# Patient Record
Sex: FEMALE | Race: WHITE | NOT HISPANIC OR LATINO | Employment: UNEMPLOYED | URBAN - METROPOLITAN AREA
[De-identification: names, ages, dates, MRNs, and addresses within clinical notes are randomized per-mention and may not be internally consistent; named-entity substitution may affect disease eponyms.]

---

## 2017-05-22 ENCOUNTER — AMBULATORY - HEALTHEAST (OUTPATIENT)
Dept: MULTI SPECIALTY CLINIC | Facility: CLINIC | Age: 37
End: 2017-05-22

## 2017-05-22 LAB
HPV_EXT - HISTORICAL: NORMAL
PAP SMEAR - HIM PATIENT REPORTED: NORMAL

## 2018-06-28 ENCOUNTER — OFFICE VISIT - HEALTHEAST (OUTPATIENT)
Dept: FAMILY MEDICINE | Facility: CLINIC | Age: 38
End: 2018-06-28

## 2018-06-28 ENCOUNTER — COMMUNICATION - HEALTHEAST (OUTPATIENT)
Dept: TELEHEALTH | Facility: CLINIC | Age: 38
End: 2018-06-28

## 2018-06-28 DIAGNOSIS — S52.501A: ICD-10-CM

## 2018-06-28 DIAGNOSIS — M25.531 RIGHT WRIST PAIN: ICD-10-CM

## 2018-06-28 DIAGNOSIS — S52.601A: ICD-10-CM

## 2018-06-28 ASSESSMENT — MIFFLIN-ST. JEOR: SCORE: 1284.44

## 2018-07-09 ENCOUNTER — SURGERY - HEALTHEAST (OUTPATIENT)
Dept: SURGERY | Facility: CLINIC | Age: 38
End: 2018-07-09

## 2018-07-09 ENCOUNTER — ANESTHESIA - HEALTHEAST (OUTPATIENT)
Dept: SURGERY | Facility: CLINIC | Age: 38
End: 2018-07-09

## 2018-07-09 ASSESSMENT — MIFFLIN-ST. JEOR: SCORE: 1296.06

## 2019-03-15 ENCOUNTER — OFFICE VISIT (OUTPATIENT)
Dept: FAMILY MEDICINE | Facility: CLINIC | Age: 39
End: 2019-03-15
Payer: MEDICAID

## 2019-03-15 VITALS
HEIGHT: 68 IN | DIASTOLIC BLOOD PRESSURE: 74 MMHG | WEIGHT: 136 LBS | RESPIRATION RATE: 16 BRPM | HEART RATE: 94 BPM | TEMPERATURE: 97.7 F | SYSTOLIC BLOOD PRESSURE: 114 MMHG | BODY MASS INDEX: 20.61 KG/M2 | OXYGEN SATURATION: 97 %

## 2019-03-15 DIAGNOSIS — Z23 NEED FOR PROPHYLACTIC VACCINATION WITH TETANUS-DIPHTHERIA (TD): ICD-10-CM

## 2019-03-15 DIAGNOSIS — F41.1 GAD (GENERALIZED ANXIETY DISORDER): Primary | ICD-10-CM

## 2019-03-15 DIAGNOSIS — Z23 NEED FOR PROPHYLACTIC VACCINATION AND INOCULATION AGAINST INFLUENZA: ICD-10-CM

## 2019-03-15 DIAGNOSIS — F41.0 PANIC ATTACK: ICD-10-CM

## 2019-03-15 PROCEDURE — 99203 OFFICE O/P NEW LOW 30 MIN: CPT | Performed by: NURSE PRACTITIONER

## 2019-03-15 RX ORDER — LORAZEPAM 0.5 MG/1
0.5 TABLET ORAL EVERY 6 HOURS PRN
Qty: 10 TABLET | Refills: 0 | Status: SHIPPED | OUTPATIENT
Start: 2019-03-15 | End: 2020-12-18

## 2019-03-15 ASSESSMENT — ENCOUNTER SYMPTOMS
DIARRHEA: 0
EYE PAIN: 0
CHILLS: 0
HEMATOCHEZIA: 0
COUGH: 0
NERVOUS/ANXIOUS: 1
FEVER: 0
ABDOMINAL PAIN: 0
CONSTIPATION: 0
DIZZINESS: 0
HEMATURIA: 0

## 2019-03-15 ASSESSMENT — PATIENT HEALTH QUESTIONNAIRE - PHQ9: SUM OF ALL RESPONSES TO PHQ QUESTIONS 1-9: 25

## 2019-03-15 ASSESSMENT — MIFFLIN-ST. JEOR: SCORE: 1347.45

## 2019-03-15 NOTE — PROGRESS NOTES
"   SUBJECTIVE:   CC: Tracy Davis is an 38 year old woman who presents for preventive health visit.     Physical   Annual:     Getting at least 3 servings of Calcium per day:  Yes    Bi-annual eye exam:  Yes    Dental care twice a year:  Yes    Sleep apnea or symptoms of sleep apnea:  None    Diet:  Regular (no restrictions)    Frequency of exercise:  2-3 days/week    Duration of exercise:  15-30 minutes    Taking medications regularly:  Yes    Additional concerns today:  No    PHQ-2 Total Score: 2    Establishing care.  She plans to go to OB/GYN for her physical/pap.    Anxiety:  Requesting anxiety medications today.  On and off anxiety meds for 8 years.  Struggling with panic attacks.  \"with Lorazepam was on a really low dosage and it was really helpful for me.\"  Previous meds used Lorazepam and lexapro.  She requests to only be on meds when she needs them.  Weekly psychotherapy.      Per boyfriend:  He has witnessed 2 of Tracy's panic attacks, September 2018 and 2 weeks ago.    Lexapro history :  \"it helps my general anxiety and worry.\"  Panic attacks       Today's PHQ-2 Score:   PHQ-2 ( 1999 Pfizer) 3/15/2019   Q1: Little interest or pleasure in doing things 1   Q2: Feeling down, depressed or hopeless 1   PHQ-2 Score 2   Q1: Little interest or pleasure in doing things Several days   Q2: Feeling down, depressed or hopeless Several days   PHQ-2 Score 2           Problem list and histories reviewed & adjusted, as indicated.  Additional history: as documented    Patient Active Problem List   Diagnosis     JUANPABLO (generalized anxiety disorder)     No past surgical history on file.    Social History     Tobacco Use     Smoking status: Not on file     Smokeless tobacco: Never Used   Substance Use Topics     Alcohol use: Yes     Family History   Problem Relation Age of Onset     Breast Cancer Mother      Hypertension Father          Current Outpatient Medications   Medication Sig Dispense Refill     LORazepam " "(ATIVAN) 0.5 MG tablet Take 1 tablet (0.5 mg) by mouth every 6 hours as needed for anxiety 10 tablet 0     sertraline (ZOLOFT) 50 MG tablet 1/2 tablet daily for 1 week and then 1 tablet daily. 30 tablet 1     No Known Allergies  No lab results found.   BP Readings from Last 3 Encounters:   03/15/19 114/74    Wt Readings from Last 3 Encounters:   03/15/19 61.7 kg (136 lb)                  Labs reviewed in EPIC    Reviewed and updated as needed this visit by clinical staff  Allergies  Meds  Problems       Reviewed and updated as needed this visit by Provider  Problems         ROS:  Constitutional, HEENT, cardiovascular, pulmonary, GI, , musculoskeletal, neuro, skin, endocrine and psych systems are negative, except as otherwise noted.    OBJECTIVE:     /74   Pulse 94   Temp 97.7  F (36.5  C) (Oral)   Resp 16   Ht 1.715 m (5' 7.5\")   Wt 61.7 kg (136 lb)   LMP 02/20/2019   SpO2 97%   BMI 20.99 kg/m    Body mass index is 20.99 kg/m .  GENERAL APPEARANCE: healthy, alert. She appears worried, nervous.  SKIN: warm and dry  PSYCH: mentation appears normal.  Good hygeine.  Good eye contact but tearful at times.  She offers information freely.   She is constantly picking at her fingers.    ASSESSMENT/PLAN:     (F41.1) JUANPABLO (generalized anxiety disorder)  (primary encounter diagnosis)  Comment: uncontrolled  Plan: sertraline (ZOLOFT) 50 MG tablet, MENTAL HEALTH        REFERRAL  - Adult; Psychiatry and Medication         Management; Psychiatry; [place]: [facility]        Psychiatry Service [phone].  Medication         management & future refills will be returned to        [place] PCP upon completion of evaluation; We         jose r..., OB/GYN REFERRAL        I had a lengthy discussion with the patient and her boyfriend today about her anxiety, panic attacks, previous Lexapro use, continuing psychotherapy, her up-and-down mood etc.  For today, I did tell her that I think Lexapro is an okay choice, but I " would prefer that she try Zoloft to start.  I like the wide dosing range, the low side effect profile, and the FDA approved uses for Zoloft including panic, anxiety, OCD etc.  She agrees and thinks Zoloft is a good choice to start.  She will take the medication daily as prescribed.  I discussed and reviewed the side effect profile she is to read the side effect profile that comes with medication.  I encouraged her to return to the clinic to see me in 1-2 months for follow-up, sooner if problems.  I did give her a prescription for 10 lorazepam tablets a day to be used only if having a panic attack.  Her previous history was 30 tablets of lorazepam lasted more than 3 years.  She was appreciative.  She did originally schedule her appointment today for a physical and anxiety discussion.  Per the patient, talking about her anxiety and panic was enough for today and she plans to return to see OB/GYN for her physical with Pap.  A referral was given.    A verbal no harm contract was done today.    (F41.0) Panic attack  Comment: Uncontrolled  Plan: LORazepam (ATIVAN) 0.5 MG tablet, MENTAL HEALTH        REFERRAL  - Adult; Psychiatry and Medication         Management; Psychiatry; [place]: [facility]        Psychiatry Service [phone].  Medication         management & future refills will be returned to        [place] PCP upon completion of evaluation; We         jose r...        As above    (Z23) Need for prophylactic vaccination and inoculation against influenza  Comment:   Plan: Discussed/declined today    (Z23) Need for prophylactic vaccination with tetanus-diphtheria (Td)  Comment:   Plan: Discussed/declined today    I spent a total of 30 min with the patient, >50% time spent face to face counseling regarding the above issues, establishing a plan of care, and coordinating follow up care.       FIDEL Nur [facility]

## 2019-03-15 NOTE — PROGRESS NOTES
"   SUBJECTIVE:   CC: Tracy Davis is an 38 year old woman who presents for preventive health visit.     Healthy Habits:    Do you get at least three servings of calcium containing foods daily (dairy, green leafy vegetables, etc.)? { :901042::\"yes\"}    Amount of exercise or daily activities, outside of work: { :848453}    Problems taking medications regularly { :939924::\"No\"}    Medication side effects: { :609389::\"No\"}    Have you had an eye exam in the past two years? { :497209}    Do you see a dentist twice per year? { :053379}    Do you have sleep apnea, excessive snoring or daytime drowsiness?{ :643318}  {Outside tests to abstract? :270432}    {additional problems to add (Optional):280788}    Today's PHQ-2 Score:   PHQ-2 ( 1999 Pfizer) 3/15/2019   Q1: Little interest or pleasure in doing things 1   Q2: Feeling down, depressed or hopeless 1   PHQ-2 Score 2   Q1: Little interest or pleasure in doing things Several days   Q2: Feeling down, depressed or hopeless Several days   PHQ-2 Score 2     {PHQ-2 LOOK IN ASSESSMENTS (Optional) :556155}  Abuse: Current or Past(Physical, Sexual or Emotional)- {YES/NO/NA:955059}  Do you feel safe in your environment? {YES/NO/NA:026410}    Social History     Tobacco Use     Smoking status: Not on file   Substance Use Topics     Alcohol use: Not on file     If you drink alcohol do you typically have >3 drinks per day or >7 drinks per week? {ETOH :485490}                     Reviewed orders with patient.  Reviewed health maintenance and updated orders accordingly - {Yes/No:021942::\"Yes\"}  {Chronicprobdata (Optional):744002}    {Mammo Decision Support (Optional):619270}    Pertinent mammograms are reviewed under the imaging tab.  History of abnormal Pap smear: {PAP HX:053490}     Reviewed and updated as needed this visit by clinical staff         Reviewed and updated as needed this visit by Provider        {HISTORY OPTIONS (Optional):116195}    ROS:  { :677080}    OBJECTIVE: " "  There were no vitals taken for this visit.  EXAM:  {Exam Choices:505617}    {Diagnostic Test Results (Optional):066833::\"Diagnostic Test Results:\",\"none \"}    ASSESSMENT/PLAN:   {Diag Picklist:383248}    COUNSELING:   {FEMALE COUNSELING MESSAGES:032643::\"Reviewed preventive health counseling, as reflected in patient instructions\"}    BP Readings from Last 1 Encounters:   No data found for BP     There is no height or weight on file to calculate BMI.    {BP Counseling- Complete if BP >= 120/80  (Optional):299352}  {Weight Management Plan (ACO) Complete if BMI is abnormal-  Ages 18-64  BMI >24.9.  Age 65+ with BMI <23 or >30 (Optional):093319}     has no tobacco history on file.  {Tobacco Cessation -- Complete if patient is a smoker (Optional):164465}    Counseling Resources:  ATP IV Guidelines  Pooled Cohorts Equation Calculator  Breast Cancer Risk Calculator  FRAX Risk Assessment  ICSI Preventive Guidelines  Dietary Guidelines for Americans, 2010  USDA's MyPlate  ASA Prophylaxis  Lung CA Screening    FIDEL Nur [facility]  "

## 2019-03-15 NOTE — PROGRESS NOTES
"   SUBJECTIVE:   CC: Tracy Davis is an 38 year old woman who presents for preventive health visit.     Healthy Habits:    Do you get at least three servings of calcium containing foods daily (dairy, green leafy vegetables, etc.)? { :005689::\"yes\"}    Amount of exercise or daily activities, outside of work: { :620623}    Problems taking medications regularly { :125010::\"No\"}    Medication side effects: { :474810::\"No\"}    Have you had an eye exam in the past two years? { :180967}    Do you see a dentist twice per year? { :727747}    Do you have sleep apnea, excessive snoring or daytime drowsiness?{ :404665}  {Outside tests to abstract? :185479}    {additional problems to add (Optional):163501}    Today's PHQ-2 Score:   PHQ-2 ( 1999 Pfizer) 3/15/2019   Q1: Little interest or pleasure in doing things 1   Q2: Feeling down, depressed or hopeless 1   PHQ-2 Score 2   Q1: Little interest or pleasure in doing things Several days   Q2: Feeling down, depressed or hopeless Several days   PHQ-2 Score 2     {PHQ-2 LOOK IN ASSESSMENTS (Optional) :347350}  Abuse: Current or Past(Physical, Sexual or Emotional)- {YES/NO/NA:281290}  Do you feel safe in your environment? {YES/NO/NA:113476}    Social History     Tobacco Use     Smoking status: Not on file   Substance Use Topics     Alcohol use: Not on file     If you drink alcohol do you typically have >3 drinks per day or >7 drinks per week? {ETOH :794954}                     Reviewed orders with patient.  Reviewed health maintenance and updated orders accordingly - {Yes/No:682005::\"Yes\"}  {Chronicprobdata (Optional):323216}    {Mammo Decision Support (Optional):718090}    Pertinent mammograms are reviewed under the imaging tab.  History of abnormal Pap smear: {PAP HX:693498}     Reviewed and updated as needed this visit by clinical staff         Reviewed and updated as needed this visit by Provider        {HISTORY OPTIONS (Optional):766065}    ROS:  { :832035}    OBJECTIVE: " "  There were no vitals taken for this visit.  EXAM:  {Exam Choices:153011}    {Diagnostic Test Results (Optional):281784::\"Diagnostic Test Results:\",\"none \"}    ASSESSMENT/PLAN:   {Diag Picklist:346980}    COUNSELING:   {FEMALE COUNSELING MESSAGES:947036::\"Reviewed preventive health counseling, as reflected in patient instructions\"}    BP Readings from Last 1 Encounters:   No data found for BP     There is no height or weight on file to calculate BMI.    {BP Counseling- Complete if BP >= 120/80  (Optional):489787}  {Weight Management Plan (ACO) Complete if BMI is abnormal-  Ages 18-64  BMI >24.9.  Age 65+ with BMI <23 or >30 (Optional):169484}     has no tobacco history on file.  {Tobacco Cessation -- Complete if patient is a smoker (Optional):321288}    Counseling Resources:  ATP IV Guidelines  Pooled Cohorts Equation Calculator  Breast Cancer Risk Calculator  FRAX Risk Assessment  ICSI Preventive Guidelines  Dietary Guidelines for Americans, 2010  USDA's MyPlate  ASA Prophylaxis  Lung CA Screening    FIDEL Nur [facility]  "

## 2019-03-15 NOTE — PATIENT INSTRUCTIONS

## 2019-11-29 ENCOUNTER — OFFICE VISIT - HEALTHEAST (OUTPATIENT)
Dept: INTERNAL MEDICINE | Facility: CLINIC | Age: 39
End: 2019-11-29

## 2019-11-29 DIAGNOSIS — R79.89 LFT ELEVATION: ICD-10-CM

## 2019-11-29 DIAGNOSIS — M21.619 BUNION: ICD-10-CM

## 2019-11-29 DIAGNOSIS — F41.1 GAD (GENERALIZED ANXIETY DISORDER): ICD-10-CM

## 2019-11-29 DIAGNOSIS — L98.9 SKIN LESION: ICD-10-CM

## 2019-11-29 LAB
ALBUMIN SERPL-MCNC: 3.8 G/DL (ref 3.5–5)
ALP SERPL-CCNC: 54 U/L (ref 45–120)
ALT SERPL W P-5'-P-CCNC: 34 U/L (ref 0–45)
ANION GAP SERPL CALCULATED.3IONS-SCNC: 9 MMOL/L (ref 5–18)
AST SERPL W P-5'-P-CCNC: 24 U/L (ref 0–40)
BILIRUB DIRECT SERPL-MCNC: 0.2 MG/DL
BILIRUB SERPL-MCNC: 0.5 MG/DL (ref 0–1)
BUN SERPL-MCNC: 4 MG/DL (ref 8–22)
CALCIUM SERPL-MCNC: 9.7 MG/DL (ref 8.5–10.5)
CHLORIDE BLD-SCNC: 102 MMOL/L (ref 98–107)
CO2 SERPL-SCNC: 26 MMOL/L (ref 22–31)
CREAT SERPL-MCNC: 0.65 MG/DL (ref 0.6–1.1)
ERYTHROCYTE [DISTWIDTH] IN BLOOD BY AUTOMATED COUNT: 11 % (ref 11–14.5)
GFR SERPL CREATININE-BSD FRML MDRD: >60 ML/MIN/1.73M2
GLUCOSE BLD-MCNC: 79 MG/DL (ref 70–125)
HCT VFR BLD AUTO: 35.6 % (ref 35–47)
HGB BLD-MCNC: 11.8 G/DL (ref 12–16)
MCH RBC QN AUTO: 33.1 PG (ref 27–34)
MCHC RBC AUTO-ENTMCNC: 33.3 G/DL (ref 32–36)
MCV RBC AUTO: 99 FL (ref 80–100)
PLATELET # BLD AUTO: 431 THOU/UL (ref 140–440)
PMV BLD AUTO: 6.9 FL (ref 7–10)
POTASSIUM BLD-SCNC: 4.3 MMOL/L (ref 3.5–5)
PROT SERPL-MCNC: 6.8 G/DL (ref 6–8)
RBC # BLD AUTO: 3.58 MILL/UL (ref 3.8–5.4)
SODIUM SERPL-SCNC: 137 MMOL/L (ref 136–145)
TSH SERPL DL<=0.005 MIU/L-ACNC: 1.87 UIU/ML (ref 0.3–5)
VIT B12 SERPL-MCNC: 529 PG/ML (ref 213–816)
WBC: 8.7 THOU/UL (ref 4–11)

## 2019-11-29 ASSESSMENT — PATIENT HEALTH QUESTIONNAIRE - PHQ9: SUM OF ALL RESPONSES TO PHQ QUESTIONS 1-9: 16

## 2019-11-29 ASSESSMENT — MIFFLIN-ST. JEOR: SCORE: 1305.7

## 2019-12-02 ENCOUNTER — COMMUNICATION - HEALTHEAST (OUTPATIENT)
Dept: INTERNAL MEDICINE | Facility: CLINIC | Age: 39
End: 2019-12-02

## 2019-12-17 ENCOUNTER — COMMUNICATION - HEALTHEAST (OUTPATIENT)
Dept: INTERNAL MEDICINE | Facility: CLINIC | Age: 39
End: 2019-12-17

## 2020-01-27 ENCOUNTER — OFFICE VISIT - HEALTHEAST (OUTPATIENT)
Dept: INTERNAL MEDICINE | Facility: CLINIC | Age: 40
End: 2020-01-27

## 2020-01-27 DIAGNOSIS — F41.1 GAD (GENERALIZED ANXIETY DISORDER): ICD-10-CM

## 2020-01-27 ASSESSMENT — MIFFLIN-ST. JEOR: SCORE: 1309.67

## 2020-02-23 ENCOUNTER — HEALTH MAINTENANCE LETTER (OUTPATIENT)
Age: 40
End: 2020-02-23

## 2020-02-28 ENCOUNTER — OFFICE VISIT - HEALTHEAST (OUTPATIENT)
Dept: INTERNAL MEDICINE | Facility: CLINIC | Age: 40
End: 2020-02-28

## 2020-02-28 DIAGNOSIS — F41.8 DEPRESSION WITH ANXIETY: ICD-10-CM

## 2020-02-28 ASSESSMENT — MIFFLIN-ST. JEOR: SCORE: 1327.81

## 2020-03-10 ENCOUNTER — COMMUNICATION - HEALTHEAST (OUTPATIENT)
Dept: PHARMACY | Facility: CLINIC | Age: 40
End: 2020-03-10

## 2020-03-10 DIAGNOSIS — F41.8 DEPRESSION WITH ANXIETY: ICD-10-CM

## 2020-04-22 ENCOUNTER — OFFICE VISIT - HEALTHEAST (OUTPATIENT)
Dept: INTERNAL MEDICINE | Facility: CLINIC | Age: 40
End: 2020-04-22

## 2020-04-22 DIAGNOSIS — M54.6 CHRONIC THORACIC BACK PAIN, UNSPECIFIED BACK PAIN LATERALITY: ICD-10-CM

## 2020-04-22 DIAGNOSIS — M25.561 CHRONIC PAIN OF BOTH KNEES: ICD-10-CM

## 2020-04-22 DIAGNOSIS — G89.29 CHRONIC THORACIC BACK PAIN, UNSPECIFIED BACK PAIN LATERALITY: ICD-10-CM

## 2020-04-22 DIAGNOSIS — G89.29 CHRONIC PAIN OF BOTH KNEES: ICD-10-CM

## 2020-04-22 DIAGNOSIS — M25.562 CHRONIC PAIN OF BOTH KNEES: ICD-10-CM

## 2020-04-29 ENCOUNTER — OFFICE VISIT - HEALTHEAST (OUTPATIENT)
Dept: PHYSICAL THERAPY | Facility: REHABILITATION | Age: 40
End: 2020-04-29

## 2020-04-29 DIAGNOSIS — M25.562 PAIN IN BOTH KNEES, UNSPECIFIED CHRONICITY: ICD-10-CM

## 2020-04-29 DIAGNOSIS — M25.561 PAIN IN BOTH KNEES, UNSPECIFIED CHRONICITY: ICD-10-CM

## 2020-05-01 ENCOUNTER — COMMUNICATION - HEALTHEAST (OUTPATIENT)
Dept: PHYSICAL THERAPY | Facility: REHABILITATION | Age: 40
End: 2020-05-01

## 2020-05-01 ENCOUNTER — HOSPITAL ENCOUNTER (OUTPATIENT)
Dept: PHYSICAL MEDICINE AND REHAB | Facility: CLINIC | Age: 40
Discharge: HOME | End: 2020-05-01
Attending: PHYSICAL MEDICINE & REHABILITATION

## 2020-05-01 DIAGNOSIS — M54.2 CERVICAL SPINE PAIN: ICD-10-CM

## 2020-05-01 DIAGNOSIS — M89.8X1 SHOULDER BLADE PAIN: ICD-10-CM

## 2020-05-12 ENCOUNTER — OFFICE VISIT - HEALTHEAST (OUTPATIENT)
Dept: PHYSICAL THERAPY | Facility: REHABILITATION | Age: 40
End: 2020-05-12

## 2020-05-12 DIAGNOSIS — M54.2 ACUTE NECK PAIN: ICD-10-CM

## 2020-05-12 DIAGNOSIS — M89.8X1 PAIN OF LEFT SCAPULA: ICD-10-CM

## 2020-05-20 ENCOUNTER — OFFICE VISIT - HEALTHEAST (OUTPATIENT)
Dept: PHYSICAL THERAPY | Facility: REHABILITATION | Age: 40
End: 2020-05-20

## 2020-05-20 DIAGNOSIS — M54.2 ACUTE NECK PAIN: ICD-10-CM

## 2020-05-20 DIAGNOSIS — M89.8X1 PAIN OF LEFT SCAPULA: ICD-10-CM

## 2020-05-21 ENCOUNTER — HOSPITAL ENCOUNTER (OUTPATIENT)
Dept: PHYSICAL MEDICINE AND REHAB | Facility: CLINIC | Age: 40
Discharge: HOME | End: 2020-05-21
Attending: PHYSICAL MEDICINE & REHABILITATION

## 2020-05-21 DIAGNOSIS — M89.8X1 SHOULDER BLADE PAIN: ICD-10-CM

## 2020-05-21 DIAGNOSIS — M54.2 CERVICAL SPINE PAIN: ICD-10-CM

## 2020-05-27 ENCOUNTER — OFFICE VISIT - HEALTHEAST (OUTPATIENT)
Dept: PHYSICAL THERAPY | Facility: REHABILITATION | Age: 40
End: 2020-05-27

## 2020-05-27 DIAGNOSIS — M54.2 ACUTE NECK PAIN: ICD-10-CM

## 2020-05-27 DIAGNOSIS — M25.561 PAIN IN BOTH KNEES, UNSPECIFIED CHRONICITY: ICD-10-CM

## 2020-05-27 DIAGNOSIS — M89.8X1 PAIN OF LEFT SCAPULA: ICD-10-CM

## 2020-05-27 DIAGNOSIS — M25.562 PAIN IN BOTH KNEES, UNSPECIFIED CHRONICITY: ICD-10-CM

## 2020-05-28 ENCOUNTER — OFFICE VISIT - HEALTHEAST (OUTPATIENT)
Dept: INTERNAL MEDICINE | Facility: CLINIC | Age: 40
End: 2020-05-28

## 2020-05-28 DIAGNOSIS — Z30.09 GENERAL COUNSELING FOR PRESCRIPTION OF ORAL CONTRACEPTIVES: ICD-10-CM

## 2020-05-28 ASSESSMENT — PATIENT HEALTH QUESTIONNAIRE - PHQ9: SUM OF ALL RESPONSES TO PHQ QUESTIONS 1-9: 5

## 2020-06-10 ENCOUNTER — COMMUNICATION - HEALTHEAST (OUTPATIENT)
Dept: PHYSICAL THERAPY | Facility: REHABILITATION | Age: 40
End: 2020-06-10

## 2020-10-28 ENCOUNTER — COMMUNICATION - HEALTHEAST (OUTPATIENT)
Dept: TELEHEALTH | Facility: CLINIC | Age: 40
End: 2020-10-28

## 2020-10-28 ENCOUNTER — RECORDS - HEALTHEAST (OUTPATIENT)
Dept: GENERAL RADIOLOGY | Facility: CLINIC | Age: 40
End: 2020-10-28

## 2020-10-28 ENCOUNTER — OFFICE VISIT - HEALTHEAST (OUTPATIENT)
Dept: PODIATRY | Facility: CLINIC | Age: 40
End: 2020-10-28

## 2020-10-28 ENCOUNTER — SURGERY - HEALTHEAST (OUTPATIENT)
Dept: PODIATRY | Facility: CLINIC | Age: 40
End: 2020-10-28

## 2020-10-28 DIAGNOSIS — M20.10 HALLUX VALGUS, UNSPECIFIED LATERALITY: ICD-10-CM

## 2020-10-28 DIAGNOSIS — M20.11 HALLUX ABDUCTOVALGUS, RIGHT: ICD-10-CM

## 2020-10-28 DIAGNOSIS — M20.10 HALLUX VALGUS (ACQUIRED), UNSPECIFIED FOOT: ICD-10-CM

## 2020-10-28 ASSESSMENT — MIFFLIN-ST. JEOR: SCORE: 1327.81

## 2020-11-03 ENCOUNTER — AMBULATORY - HEALTHEAST (OUTPATIENT)
Dept: SURGERY | Facility: AMBULATORY SURGERY CENTER | Age: 40
End: 2020-11-03

## 2020-11-03 DIAGNOSIS — Z11.59 ENCOUNTER FOR SCREENING FOR OTHER VIRAL DISEASES: ICD-10-CM

## 2020-11-19 ENCOUNTER — OFFICE VISIT - HEALTHEAST (OUTPATIENT)
Dept: INTERNAL MEDICINE | Facility: CLINIC | Age: 40
End: 2020-11-19

## 2020-11-19 DIAGNOSIS — Z01.818 PREOP GENERAL PHYSICAL EXAM: ICD-10-CM

## 2020-11-19 DIAGNOSIS — F41.8 DEPRESSION WITH ANXIETY: ICD-10-CM

## 2020-11-19 DIAGNOSIS — J34.2 DEVIATED NASAL SEPTUM: ICD-10-CM

## 2020-11-19 DIAGNOSIS — M20.11 HALLUX ABDUCTOVALGUS, RIGHT: ICD-10-CM

## 2020-11-19 DIAGNOSIS — J30.2 SEASONAL ALLERGIC RHINITIS, UNSPECIFIED TRIGGER: ICD-10-CM

## 2020-11-19 LAB
ERYTHROCYTE [DISTWIDTH] IN BLOOD BY AUTOMATED COUNT: 10.9 % (ref 11–14.5)
HCT VFR BLD AUTO: 39.9 % (ref 35–47)
HGB BLD-MCNC: 13.4 G/DL (ref 12–16)
MCH RBC QN AUTO: 30.5 PG (ref 27–34)
MCHC RBC AUTO-ENTMCNC: 33.5 G/DL (ref 32–36)
MCV RBC AUTO: 91 FL (ref 80–100)
PLATELET # BLD AUTO: 313 THOU/UL (ref 140–440)
PMV BLD AUTO: 6.5 FL (ref 7–10)
RBC # BLD AUTO: 4.38 MILL/UL (ref 3.8–5.4)
WBC: 9.7 THOU/UL (ref 4–11)

## 2020-11-19 ASSESSMENT — PATIENT HEALTH QUESTIONNAIRE - PHQ9: SUM OF ALL RESPONSES TO PHQ QUESTIONS 1-9: 0

## 2020-11-19 ASSESSMENT — MIFFLIN-ST. JEOR: SCORE: 1345.96

## 2020-11-20 LAB
ANION GAP SERPL CALCULATED.3IONS-SCNC: 6 MMOL/L (ref 5–18)
BUN SERPL-MCNC: 7 MG/DL (ref 8–22)
CALCIUM SERPL-MCNC: 8.3 MG/DL (ref 8.5–10.5)
CHLORIDE BLD-SCNC: 103 MMOL/L (ref 98–107)
CO2 SERPL-SCNC: 27 MMOL/L (ref 22–31)
CREAT SERPL-MCNC: 0.7 MG/DL (ref 0.6–1.1)
GFR SERPL CREATININE-BSD FRML MDRD: >60 ML/MIN/1.73M2
GLUCOSE BLD-MCNC: 73 MG/DL (ref 70–125)
POTASSIUM BLD-SCNC: 3.9 MMOL/L (ref 3.5–5)
SODIUM SERPL-SCNC: 136 MMOL/L (ref 136–145)

## 2020-11-30 ENCOUNTER — AMBULATORY - HEALTHEAST (OUTPATIENT)
Dept: LAB | Facility: CLINIC | Age: 40
End: 2020-11-30

## 2020-11-30 DIAGNOSIS — Z11.59 ENCOUNTER FOR SCREENING FOR OTHER VIRAL DISEASES: ICD-10-CM

## 2020-12-02 ENCOUNTER — TRANSCRIBE ORDERS (OUTPATIENT)
Dept: OTHER | Age: 40
End: 2020-12-02

## 2020-12-02 ENCOUNTER — COMMUNICATION - HEALTHEAST (OUTPATIENT)
Dept: SCHEDULING | Facility: CLINIC | Age: 40
End: 2020-12-02

## 2020-12-02 ENCOUNTER — OFFICE VISIT - HEALTHEAST (OUTPATIENT)
Dept: OTOLARYNGOLOGY | Facility: CLINIC | Age: 40
End: 2020-12-02

## 2020-12-02 DIAGNOSIS — J34.2 DEVIATED NASAL SEPTUM: ICD-10-CM

## 2020-12-02 DIAGNOSIS — J34.2 DEVIATED NASAL SEPTUM: Primary | ICD-10-CM

## 2020-12-03 ENCOUNTER — ANESTHESIA - HEALTHEAST (OUTPATIENT)
Dept: SURGERY | Facility: AMBULATORY SURGERY CENTER | Age: 40
End: 2020-12-03

## 2020-12-03 ASSESSMENT — MIFFLIN-ST. JEOR: SCORE: 1327.81

## 2020-12-04 ENCOUNTER — SURGERY - HEALTHEAST (OUTPATIENT)
Dept: SURGERY | Facility: AMBULATORY SURGERY CENTER | Age: 40
End: 2020-12-04

## 2020-12-04 ASSESSMENT — MIFFLIN-ST. JEOR: SCORE: 1327.81

## 2020-12-06 ENCOUNTER — HEALTH MAINTENANCE LETTER (OUTPATIENT)
Age: 40
End: 2020-12-06

## 2020-12-07 ENCOUNTER — COMMUNICATION - HEALTHEAST (OUTPATIENT)
Dept: VASCULAR SURGERY | Facility: CLINIC | Age: 40
End: 2020-12-07

## 2020-12-14 ENCOUNTER — OFFICE VISIT - HEALTHEAST (OUTPATIENT)
Dept: PODIATRY | Facility: CLINIC | Age: 40
End: 2020-12-14

## 2020-12-14 DIAGNOSIS — M20.11 HALLUX ABDUCTOVALGUS, RIGHT: ICD-10-CM

## 2020-12-16 NOTE — TELEPHONE ENCOUNTER
FUTURE VISIT INFORMATION      FUTURE VISIT INFORMATION:    Date: 12/18/20    Time: 8:00am    Location: [facility]  REFERRAL INFORMATION:    Referring provider:  Andre Moss    Referring providers clinic:  [facility]    Reason for visit/diagnosis  Deviated nasal septum    RECORDS REQUESTED FROM:       Clinic name Comments Records Status Imaging Status   [facility] Hospital visit Consult 12/2/20 EPIC

## 2020-12-17 ENCOUNTER — OFFICE VISIT - HEALTHEAST (OUTPATIENT)
Dept: ALLERGY | Facility: CLINIC | Age: 40
End: 2020-12-17

## 2020-12-17 DIAGNOSIS — J31.0 CHRONIC RHINITIS: ICD-10-CM

## 2020-12-17 DIAGNOSIS — J30.1 SEASONAL ALLERGIC RHINITIS DUE TO POLLEN: ICD-10-CM

## 2020-12-17 ASSESSMENT — MIFFLIN-ST. JEOR: SCORE: 1327.81

## 2020-12-18 ENCOUNTER — AMBULATORY - HEALTHEAST (OUTPATIENT)
Dept: ALLERGY | Facility: CLINIC | Age: 40
End: 2020-12-18

## 2020-12-18 ENCOUNTER — PRE VISIT (OUTPATIENT)
Dept: PLASTIC SURGERY | Facility: CLINIC | Age: 40
End: 2020-12-18

## 2020-12-18 ENCOUNTER — OFFICE VISIT (OUTPATIENT)
Dept: PLASTIC SURGERY | Facility: CLINIC | Age: 40
End: 2020-12-18
Payer: COMMERCIAL

## 2020-12-18 ENCOUNTER — COMMUNICATION - HEALTHEAST (OUTPATIENT)
Dept: ALLERGY | Facility: CLINIC | Age: 40
End: 2020-12-18

## 2020-12-18 VITALS
WEIGHT: 138 LBS | SYSTOLIC BLOOD PRESSURE: 110 MMHG | OXYGEN SATURATION: 98 % | DIASTOLIC BLOOD PRESSURE: 55 MMHG | HEART RATE: 68 BPM | BODY MASS INDEX: 20.92 KG/M2 | HEIGHT: 68 IN

## 2020-12-18 DIAGNOSIS — J34.89 REFRACTORY OBSTRUCTION OF NASAL AIRWAY: Primary | ICD-10-CM

## 2020-12-18 DIAGNOSIS — J31.0 CHRONIC RHINITIS: ICD-10-CM

## 2020-12-18 PROBLEM — R94.31 ABNORMAL ECG: Status: ACTIVE | Noted: 2018-10-18

## 2020-12-18 PROBLEM — F10.230 ALCOHOL DEPENDENCE WITH WITHDRAWAL, UNCOMPLICATED (H): Status: ACTIVE | Noted: 2018-10-18

## 2020-12-18 PROBLEM — M20.11 HALLUX ABDUCTOVALGUS, RIGHT: Status: ACTIVE | Noted: 2020-11-03

## 2020-12-18 PROBLEM — F41.8 DEPRESSION WITH ANXIETY: Status: ACTIVE | Noted: 2020-12-18

## 2020-12-18 PROBLEM — G47.9 SLEEP DIFFICULTIES: Status: ACTIVE | Noted: 2020-12-18

## 2020-12-18 PROBLEM — F41.0 PANIC: Status: ACTIVE | Noted: 2018-10-18

## 2020-12-18 LAB
A ALTERNATA IGE QN: <0.35 KU/L
A FUMIGATUS IGE QN: <0.35 KU/L
BERMUDA GRASS IGE QN: <0.35 KU/L
C HERBARUM IGE QN: <0.35 KU/L
CAT DANDER IGG QN: <0.35 KU/L
CEDAR IGE QN: <0.35 KU/L
COMMON RAGWEED IGE QN: <0.35 KU/L
COTTONWOOD IGE QN: <0.35 KU/L
D FARINAE IGE QN: <0.35 KU/L
D PTERONYSS IGE QN: <0.35 KU/L
DOG DANDER+EPITH IGE QN: <0.35 KU/L
MAPLE IGE QN: <0.35 KU/L
MARSH ELDER IGE QN: <0.35 KU/L
MOUSE URINE PROT IGE QN: <0.35 KU/L
NETTLE IGE QN: <0.35 KU/L
P NOTATUM IGE QN: <0.35 KU/L
ROACH IGE QN: <0.35 KU/L
SALTWORT IGE QN: <0.35 KU/L
SILVER BIRCH IGE QN: <0.35 KU/L
TIMOTHY IGE QN: <0.35 KU/L
TOTAL IGE - HISTORICAL: 17.9 KU/L (ref 0–100)
WHITE ASH IGE QN: <0.35 KU/L
WHITE ELM IGE QN: <0.35 KU/L
WHITE MULBERRY IGE QN: <0.35 KU/L
WHITE OAK IGE QN: <0.35 KU/L

## 2020-12-18 PROCEDURE — 99203 OFFICE O/P NEW LOW 30 MIN: CPT | Performed by: PLASTIC SURGERY

## 2020-12-18 RX ORDER — QUETIAPINE FUMARATE 25 MG/1
12.5 TABLET, FILM COATED ORAL
COMMUNITY
Start: 2020-02-24

## 2020-12-18 RX ORDER — BACLOFEN 20 MG/1
TABLET ORAL
COMMUNITY
Start: 2020-08-31

## 2020-12-18 RX ORDER — TRAZODONE HYDROCHLORIDE 50 MG/1
50 TABLET, FILM COATED ORAL
COMMUNITY
Start: 2020-02-28

## 2020-12-18 RX ORDER — METHYLPHENIDATE HYDROCHLORIDE 5 MG/1
TABLET ORAL
COMMUNITY
Start: 2020-09-28

## 2020-12-18 RX ORDER — MULTIVITAMIN,THERAPEUTIC
1 TABLET ORAL
COMMUNITY
Start: 2020-02-25

## 2020-12-18 RX ORDER — ESCITALOPRAM OXALATE 20 MG/1
TABLET ORAL
COMMUNITY
Start: 2020-10-13

## 2020-12-18 ASSESSMENT — PAIN SCALES - GENERAL: PAINLEVEL: NO PAIN (0)

## 2020-12-18 ASSESSMENT — MIFFLIN-ST. JEOR: SCORE: 1350.49

## 2020-12-18 NOTE — NURSING NOTE
"Chief Complaint   Patient presents with     Consult     deviated septum (R deviation), referred by ENT 12/2/20       Vitals:    12/18/20 0810   BP: 110/55   BP Location: Left arm   Patient Position: Chair   Cuff Size: Adult Regular   Pulse: 68   SpO2: 98%   Weight: 62.6 kg (138 lb)   Height: 1.721 m (5' 7.75\")       Body mass index is 21.14 kg/m .    Jose Francisco Sheffield, EMT    "

## 2020-12-18 NOTE — LETTER
12/18/2020       RE: Tracy Davis  [address]  [place] [address]     Dear Colleague,    Thank you for referring your patient, Tracy Davis, to the [facility] [place] at [facility]. Please see a copy of my visit note below.    PLASTIC SURGERY HISTORY AND PHYSICAL    Tracy Davis MRN# [id number]   Age: 40 year old YOB: 1980     Primary care provider: No Ref-Primary, Physician    CHIEF COMPLAINT: nasal airway obstruction    HPI: 40 year old female presents for initial consultation for potential septorhinoplasty. Their motivation for the surgery include improvement of her nasal breathing and straightening her deviated nose. Aspects of their nose that they would like to be made different include the tip asymmetry and shifting to the LEFT. She also has concerns about her hump along the dorsum. Regarding her airway, she reports her LEFT side consistently feeling more open. She has no history of nasal trauma. She has attempted flonase in past given her seasonal allergies, but feel that it has been minimally helpful this past year. She has difficulty breathing through her nose even without allergies. She does snore occasionally. Patient has not had prior nasal surgery.     Denies active smoking or any other recreational drug use. No prior family history of bleeding disorders. Patient does not have diabetes, hypertension, heart failure, stroke, arrhthymias, coagulopathy, immunocompromise.     PMH:  No past medical history on file.    PSH:  No past surgical history on file.    FH:  Family History   Problem Relation Age of Onset     Breast Cancer Mother      Hypertension Father        SH:  Social History     Tobacco Use     Smoking status: Former Smoker     Packs/day: 0.10     Years: 19.00     Pack years: 1.90     Types: Cigarettes     Start date: 2000     Quit date: 2019     Years since  quittin.9     Smokeless tobacco: Never Used   Substance Use Topics     Alcohol use: Yes     Drug use: No       MEDS:  Current Outpatient Medications   Medication     LORazepam (ATIVAN) 0.5 MG tablet     sertraline (ZOLOFT) 50 MG tablet     No current facility-administered medications for this visit.        ALLERGIES:   No Known Allergies    ROS: Negative except for HPI    EXAM:  No acute distress  Regular  Nonlabored  Warm, well-perfused    FOCUSED NASAL EXAM:  Glabella: minimally prominent  Radix/Bony upper third: obtuse nasofrontal, nasofacial angles  Midvault: slightly widened  Tip: Boxy, bulbous tip with continuation of C-shaped deviation to the LEFT, slight underrotation, projection  Columella: Non-deviated, appropriate width, appropriate medial crural footplate width  Alar rims: no collapse, slightly concave  Nasal sill: symmetric  Septum: Anterior septum deviated to the RIGHT  Internal valve: improved with Mesa maneuver bilaterally, RIGHT >> left  Turbinates: non-hypertrophied  Chin: appropriately projected    ASSESSMENT/PLAN:  40 year old female presents with nasal deformities including deviated septum, narrow internal nasal valve, deviated bony dorsum, deviated and boxy nasal tip. This would be amenable to surgical intervention in the form of a septorhinoplasty. Components of the surgery  may include: Septoplasty with septal harvest, bilateral spread grafts, dorsum reduction, and tip suturing. Discussion was entered regarding the risks of surgery which include infection, bleeding, swelling, asymmetry, warping of cartilage, scarring, patient dissatisfaction, septal injury, evolution of appearance with time. The patient voiced understanding and remains interested in surgical rhinoplasty.    At least 30 minutes was spent with patient, of which more than 50 percent of that time is spent discussing the diagnosis, prognosis, risk and benefits, instructions for management, and education.     Sophy Thompson,  MD  Pediatric Cleft and Craniofacial Surgery  Division of Plastic Surgery  [facility]  Pager: [phone]

## 2020-12-18 NOTE — PROGRESS NOTES
PLASTIC SURGERY HISTORY AND PHYSICAL    Tracy Davis MRN# [id number]   Age: 40 year old YOB: 1980     Primary care provider: No Ref-Primary, Physician    CHIEF COMPLAINT: nasal airway obstruction    HPI: 40 year old female presents for initial consultation for potential septorhinoplasty. Their motivation for the surgery include improvement of her nasal breathing and straightening her deviated nose. Aspects of their nose that they would like to be made different include the tip asymmetry and shifting to the LEFT. She also has concerns about her hump along the dorsum. Regarding her airway, she reports her LEFT side consistently feeling more open. She has no history of nasal trauma. She has attempted flonase in past given her seasonal allergies, but feel that it has been minimally helpful this past year. She has difficulty breathing through her nose even without allergies. She does snore occasionally. Patient has not had prior nasal surgery.     Denies active smoking or any other recreational drug use. No prior family history of bleeding disorders. Patient does not have diabetes, hypertension, heart failure, stroke, arrhthymias, coagulopathy, immunocompromise.     PMH:  No past medical history on file.    PSH:  No past surgical history on file.    FH:  Family History   Problem Relation Age of Onset     Breast Cancer Mother      Hypertension Father        SH:  Social History     Tobacco Use     Smoking status: Former Smoker     Packs/day: 0.10     Years: 19.00     Pack years: 1.90     Types: Cigarettes     Start date:      Quit date: 2019     Years since quittin.9     Smokeless tobacco: Never Used   Substance Use Topics     Alcohol use: Yes     Drug use: No       MEDS:  Current Outpatient Medications   Medication     LORazepam (ATIVAN) 0.5 MG tablet     sertraline (ZOLOFT) 50 MG tablet     No current facility-administered medications for this visit.        ALLERGIES:   No Known  Allergies    ROS: Negative except for HPI    EXAM:  No acute distress  Regular  Nonlabored  Warm, well-perfused    FOCUSED NASAL EXAM:  Glabella: minimally prominent  Radix/Bony upper third: obtuse nasofrontal, nasofacial angles  Midvault: slightly widened  Tip: Boxy, bulbous tip with continuation of C-shaped deviation to the LEFT, slight underrotation, projection  Columella: Non-deviated, appropriate width, appropriate medial crural footplate width  Alar rims: no collapse, slightly concave  Nasal sill: symmetric  Septum: Anterior septum deviated to the RIGHT  Internal valve: improved with Nella maneuver bilaterally, RIGHT >> left  Turbinates: non-hypertrophied  Chin: appropriately projected    ASSESSMENT/PLAN:  40 year old female presents with nasal deformities including deviated septum, narrow internal nasal valve, deviated bony dorsum, deviated and boxy nasal tip. This would be amenable to surgical intervention in the form of a septorhinoplasty. Components of the surgery  may include: Septoplasty with septal harvest, bilateral spread grafts, dorsum reduction, and tip suturing. Discussion was entered regarding the risks of surgery which include infection, bleeding, swelling, asymmetry, warping of cartilage, scarring, patient dissatisfaction, septal injury, evolution of appearance with time. The patient voiced understanding and remains interested in surgical rhinoplasty.    At least 30 minutes was spent with patient, of which more than 50 percent of that time is spent discussing the diagnosis, prognosis, risk and benefits, instructions for management, and education.     Sophy Thompson MD  Pediatric Cleft and Craniofacial Surgery  Division of Plastic Surgery  [facility]  Pager: [phone]

## 2020-12-21 ENCOUNTER — OFFICE VISIT - HEALTHEAST (OUTPATIENT)
Dept: PODIATRY | Facility: CLINIC | Age: 40
End: 2020-12-21

## 2020-12-21 DIAGNOSIS — M20.11 HALLUX ABDUCTOVALGUS, RIGHT: ICD-10-CM

## 2021-01-20 ENCOUNTER — OFFICE VISIT - HEALTHEAST (OUTPATIENT)
Dept: PODIATRY | Facility: CLINIC | Age: 41
End: 2021-01-20

## 2021-01-20 DIAGNOSIS — M20.11 HALLUX ABDUCTOVALGUS, RIGHT: ICD-10-CM

## 2021-01-20 ASSESSMENT — MIFFLIN-ST. JEOR: SCORE: 1327.81

## 2021-01-26 ENCOUNTER — COMMUNICATION - HEALTHEAST (OUTPATIENT)
Dept: ADMINISTRATIVE | Facility: CLINIC | Age: 41
End: 2021-01-26

## 2021-01-26 ENCOUNTER — AMBULATORY - HEALTHEAST (OUTPATIENT)
Dept: VASCULAR SURGERY | Facility: CLINIC | Age: 41
End: 2021-01-26

## 2021-01-26 DIAGNOSIS — M20.11 HALLUX ABDUCTOVALGUS, RIGHT: ICD-10-CM

## 2021-04-11 ENCOUNTER — HEALTH MAINTENANCE LETTER (OUTPATIENT)
Age: 41
End: 2021-04-11

## 2021-05-26 ASSESSMENT — PATIENT HEALTH QUESTIONNAIRE - PHQ9
SUM OF ALL RESPONSES TO PHQ QUESTIONS 1-9: 5
SUM OF ALL RESPONSES TO PHQ QUESTIONS 1-9: 16
SUM OF ALL RESPONSES TO PHQ QUESTIONS 1-9: 0

## 2021-05-27 VITALS
TEMPERATURE: 98 F | DIASTOLIC BLOOD PRESSURE: 70 MMHG | HEART RATE: 76 BPM | RESPIRATION RATE: 16 BRPM | SYSTOLIC BLOOD PRESSURE: 116 MMHG

## 2021-05-27 VITALS
HEART RATE: 60 BPM | SYSTOLIC BLOOD PRESSURE: 116 MMHG | TEMPERATURE: 98.5 F | RESPIRATION RATE: 16 BRPM | DIASTOLIC BLOOD PRESSURE: 74 MMHG

## 2021-06-01 VITALS — BODY MASS INDEX: 19.1 KG/M2 | HEIGHT: 68 IN | WEIGHT: 126 LBS

## 2021-06-01 VITALS — BODY MASS INDEX: 18.84 KG/M2 | WEIGHT: 124.31 LBS | HEIGHT: 68 IN

## 2021-06-03 NOTE — PROGRESS NOTES
Office Visit   Tracy Davis   39 y.o. female    Date of Visit: 11/29/2019    Chief Complaint   Patient presents with     Get established visit        Assessment and Plan   1. JUANPABLO (generalized anxiety disorder)  She is started Zoloft about a week and a half ago.  So far she has not had any side effects.  Anxiety has been a significant problem for her for some time and she is interested in seeing both psychiatry and psychology.  I think that that would be appropriate.  We will set that up and continue her medications in the meantime.  - Ambulatory referral to Psychiatry  - Ambulatory referral to Psychology  - sertraline (ZOLOFT) 50 MG tablet; Take 1 tablet (50 mg total) by mouth daily.  Dispense: 90 tablet; Refill: 1  - hydrOXYzine HCl (ATARAX) 25 MG tablet; Take 1-2 tablets (25-50 mg total) by mouth daily as needed for anxiety.  Dispense: 30 tablet; Refill: 1    2. Skin lesion  Like to have dermatology look at the skin lesion on her face and will set that up.  - Ambulatory referral to Dermatology    3. LFT elevation  She was in the emergency department in October due to alcohol.  Her liver enzymes were elevated and some other labs were abnormal.  I will go ahead and recheck her labs today.  She has not been drinking ever since.  - HM2(CBC w/o Differential)  - Basic Metabolic Panel  - Thyroid Cascade  - Vitamin B12  - Hepatic Profile    4. Bunion  Hands bilaterally that are starting to cause her pain.  She would like to see podiatry.  - Ambulatory referral to Podiatry       No follow-ups on file.     History of Present Illness   This 39 y.o. old female comes in to establish care and discuss medical problems.  She has a history of anxiety.  It has been long-standing.  She recently started taking sertraline 50 mg daily.  So far she is tolerating the medication but has not really noticed any improvement.  She also has a history of alcohol abuse and was in the emergency department in October due to complications  "from this.  Her liver enzymes were elevated.  Her hemoglobin was a little bit low.  She has not drank since then.  She is interested in seeing a psychiatrist and a psychologist and I think that would be beneficial with her anxiety and history of alcohol abuse.  She also notes a skin lesion on her face that she would like to have evaluated in dermatology and bilateral bunions that are starting to cause her pain.  We did review age-appropriate health maintenance and she is otherwise up-to-date.    Review of Systems: As above, systems otherwise reviewed and negative.     Medications, Allergies and Problem List   Patient Active Problem List   Diagnosis     JUANPABLO (generalized anxiety disorder)     Current Outpatient Medications   Medication Sig Dispense Refill     hydrOXYzine HCl (ATARAX) 25 MG tablet Take 1-2 tablets (25-50 mg total) by mouth daily as needed for anxiety. 30 tablet 1     ibuprofen (ADVIL,MOTRIN) 200 MG tablet Take 400 mg by mouth every 6 (six) hours as needed for pain.       sertraline (ZOLOFT) 50 MG tablet Take 1 tablet (50 mg total) by mouth daily. 90 tablet 1     No current facility-administered medications for this visit.      No Known Allergies       Physical Exam     /80 (Patient Site: Right Arm, Patient Position: Sitting)   Pulse (!) 58   Ht 5' 7.5\" (1.715 m)   Wt 129 lb (58.5 kg)   SpO2 98%   BMI 19.91 kg/m      General:  Patient is alert and in no apparent distress.  Cardiovascular:  Regular rate and rhythm, normal S1/S2, no murmurs, rubs, or gallop.  Pulmonary:  Lungs are clear to auscultation bilaterally with normal respiratory effort.  Extremities: Foot exam does show bilateral bunions.  Neurologic Cranial nerves are intact.  No focal deficits.  Psychiatric:  Pleasant, no confusion or agitation   Skin:  Warm and well perfused.  On her right cheek there is a raised papule.         Additional Information   Social History     Tobacco Use     Smoking status: Former Smoker     Types: " Cigarettes     Smokeless tobacco: Never Used     Tobacco comment: one or two cigs a day    Substance Use Topics     Alcohol use: Not Currently     Drug use: No            Funmi Anaya MD

## 2021-06-04 VITALS
SYSTOLIC BLOOD PRESSURE: 100 MMHG | HEIGHT: 68 IN | BODY MASS INDEX: 19.55 KG/M2 | WEIGHT: 129 LBS | DIASTOLIC BLOOD PRESSURE: 80 MMHG | OXYGEN SATURATION: 98 % | HEART RATE: 58 BPM

## 2021-06-04 VITALS
DIASTOLIC BLOOD PRESSURE: 74 MMHG | HEART RATE: 88 BPM | HEIGHT: 68 IN | OXYGEN SATURATION: 98 % | BODY MASS INDEX: 20.16 KG/M2 | WEIGHT: 133 LBS | SYSTOLIC BLOOD PRESSURE: 100 MMHG

## 2021-06-04 VITALS
HEART RATE: 86 BPM | BODY MASS INDEX: 19.55 KG/M2 | HEIGHT: 68 IN | SYSTOLIC BLOOD PRESSURE: 102 MMHG | DIASTOLIC BLOOD PRESSURE: 72 MMHG | OXYGEN SATURATION: 98 % | WEIGHT: 129 LBS

## 2021-06-05 VITALS — WEIGHT: 133 LBS | HEIGHT: 68 IN | BODY MASS INDEX: 20.16 KG/M2

## 2021-06-05 VITALS
DIASTOLIC BLOOD PRESSURE: 70 MMHG | TEMPERATURE: 97 F | HEIGHT: 68 IN | BODY MASS INDEX: 20.76 KG/M2 | WEIGHT: 137 LBS | SYSTOLIC BLOOD PRESSURE: 90 MMHG | OXYGEN SATURATION: 97 % | HEART RATE: 61 BPM

## 2021-06-05 VITALS — HEART RATE: 64 BPM | WEIGHT: 133 LBS | OXYGEN SATURATION: 98 % | HEIGHT: 68 IN | BODY MASS INDEX: 20.16 KG/M2

## 2021-06-05 VITALS — BODY MASS INDEX: 20.16 KG/M2 | HEIGHT: 68 IN | WEIGHT: 133 LBS

## 2021-06-05 VITALS — HEART RATE: 66 BPM | WEIGHT: 133 LBS | HEIGHT: 68 IN | BODY MASS INDEX: 20.16 KG/M2 | OXYGEN SATURATION: 100 %

## 2021-06-05 NOTE — PROGRESS NOTES
Office Visit   Tracy Davis   39 y.o. female    Date of Visit: 1/27/2020    Chief Complaint   Patient presents with     Follow-up     Medications        Assessment and Plan   1. JUANPABLO (generalized anxiety disorder)  She has not improved with sertraline.  States that in the past paroxetine was helpful and she like to go back on that.  I have given her a prescription.  I will also give her a small prescription for lorazepam for times when she has panic on top of the anxiety.  She does not take it very often.  She understands the potential risk of these medications.  - PARoxetine (PAXIL) 20 MG tablet; Take 1 tablet (20 mg total) by mouth daily.  Dispense: 90 tablet; Refill: 3  - LORazepam (ATIVAN) 0.5 MG tablet; Take 1 tablet (0.5 mg total) by mouth daily as needed for anxiety.  Dispense: 20 tablet; Refill: 0       No follow-ups on file.     History of Present Illness   This 39 y.o. old female comes in to follow-up.  She has a history of anxiety disorder and when I last saw her we started sertraline.  She does not think it is been very helpful.  She also notes that she has had some panic attacks and in the past was on lorazepam which was helpful.  She feels that she would take it at most 3 times a week.  She also was on paroxetine in the past and would like to take that instead of the sertraline.  She has no other acute concerns.    Review of Systems: As above, systems otherwise reviewed and negative.     Medications, Allergies and Problem List   Patient Active Problem List   Diagnosis     JUANPABLO (generalized anxiety disorder)     Current Outpatient Medications   Medication Sig Dispense Refill     LORazepam (ATIVAN) 0.5 MG tablet Take 1 tablet (0.5 mg total) by mouth daily as needed for anxiety. 20 tablet 0     PARoxetine (PAXIL) 20 MG tablet Take 1 tablet (20 mg total) by mouth daily. 90 tablet 3     No current facility-administered medications for this visit.      No Known Allergies       Physical Exam     BP  "102/72 (Patient Site: Left Arm, Patient Position: Sitting)   Pulse 86   Ht 5' 7.75\" (1.721 m)   Wt 129 lb (58.5 kg)   SpO2 98%   BMI 19.76 kg/m      General: This is an alert female in no apparent distress.     Additional Information   Social History     Tobacco Use     Smoking status: Former Smoker     Types: Cigarettes     Smokeless tobacco: Never Used     Tobacco comment: one or two cigs a day    Substance Use Topics     Alcohol use: Not Currently     Drug use: No          Funmi Anaya MD    "

## 2021-06-06 NOTE — PROGRESS NOTES
Office Visit   Tracy Davis   39 y.o. female    Date of Visit: 2/28/2020    Chief Complaint   Patient presents with     Follow-up     Hospital follow up        Assessment and Plan   1. Depression with anxiety  She was recently hospitalized due to alcohol intoxication and due to her anxiety and depression.  Upon discharge it was recommended that she go into treatment.  Unfortunately she has not been able to set that up yet but is working on it.  She also has an intake appointment with psychiatry next week.  She will let me know if she needs a new referral to either chemical dependency or psychiatry.  - traZODone (DESYREL) 50 MG tablet; Take 1 tablet (50 mg total) by mouth at bedtime.  Dispense: 30 tablet; Refill: 0    2. Alcoholism /alcohol abuse (H)       No follow-ups on file.     History of Present Illness   This 39 y.o. old female comes in to follow-up on a recent hospitalization.  She was in the hospital for 4 days due to acute alcohol intoxication and severe anxiety and depression.  Her medications were adjusted.  She was discharged earlier this week with the plan for her to go into outpatient treatment.  She has not been able to set that up yet.  She does have a contact that she is working with and has an intake appointment with psychiatry next week.  She has no other acute concerns today.    Review of Systems: As above, systems otherwise reviewed and negative.     Medications, Allergies and Problem List   Patient Active Problem List   Diagnosis     JUANPABLO (generalized anxiety disorder)     Alcohol poisoning     Suicidal ideation     Depression with anxiety     Sleep difficulties     Alcoholic intoxication without complication (H)     Alcoholism /alcohol abuse (H)     Current Outpatient Medications   Medication Sig Dispense Refill     escitalopram oxalate (LEXAPRO) 10 MG tablet Take 1 tablet (10 mg total) by mouth daily. 30 tablet 0     folic acid (FOLVITE) 1 MG tablet Take 1 tablet (1 mg total) by mouth  "daily. 30 tablet 0     gabapentin (NEURONTIN) 300 MG capsule Take 1 capsule (300 mg total) by mouth at bedtime. 30 capsule 0     hydrOXYzine pamoate (VISTARIL) 25 MG capsule Take 1 capsule (25 mg total) by mouth 3 (three) times a day as needed for anxiety. 30 capsule 0     multivitamin therapeutic tablet Take 1 tablet by mouth daily. 30 tablet 0     nicotine polacrilex (COMMIT) 2 MG lozenge Apply 1 lozenge (2 mg total) to the mouth or throat every hour as needed for smoking cessation.  0     QUEtiapine (SEROQUEL) 25 MG tablet Take 0.5 tablets (12.5 mg total) by mouth 3 (three) times a day as needed (anxiety). 30 tablet 0     thiamine 100 MG tablet Take 1 tablet (100 mg total) by mouth daily.  0     traZODone (DESYREL) 50 MG tablet Take 1 tablet (50 mg total) by mouth at bedtime. 30 tablet 0     No current facility-administered medications for this visit.      Post Discharge Medication Reconciliation Status: discharge medications reconciled, continue medications without change    No Known Allergies       Physical Exam     /74 (Patient Site: Left Arm, Patient Position: Sitting)   Pulse 88   Ht 5' 7.75\" (1.721 m)   Wt 133 lb (60.3 kg)   SpO2 98%   BMI 20.37 kg/m      General: This is an alert female in no apparent distress.     Additional Information   Social History     Tobacco Use     Smoking status: Former Smoker     Types: Cigarettes     Smokeless tobacco: Never Used     Tobacco comment: one or two cigs a day    Substance Use Topics     Alcohol use: Not Currently     Drug use: No          Funmi Anaya MD    "

## 2021-06-07 NOTE — PROGRESS NOTES
"Tracy Davis is a 39 y.o. female who is being evaluated via a billable telephone visit.      The patient has been notified of following:     \"This telephone visit will be conducted via a call between you and your physician/provider. We have found that certain health care needs can be provided without the need for a physical exam.  This service lets us provide the care you need with a short phone conversation.  If a prescription is necessary we can send it directly to your pharmacy.  If lab work is needed we can place an order for that and you can then stop by our lab to have the test done at a later time.    Telephone visits are billed at different rates depending on your insurance coverage. During this emergency period, for some insurers they may be billed the same as an in-person visit.  Please reach out to your insurance provider with any questions.    If during the course of the call the physician/provider feels a telephone visit is not appropriate, you will not be charged for this service.\"    Patient has given verbal consent to a Telephone visit? Yes    Patient would like to receive their AVS by AVS Preference: Almita.      Telephone visit   Tracy Davis   39 y.o. female    Date of Visit: 4/22/2020    Chief Complaint   Patient presents with     Knee Pain     Bilateral knee pain for 1 month, pinche nerve in back for 2 months        Assessment and Plan   1. Chronic thoracic back pain, unspecified back pain laterality  She is now been having mid thoracic back pain that is radicular in nature for 2 months.  It does not seem to be getting better.  It bothers her constantly.  I think that she would benefit from seeing the spine clinic and will help her set that up.  - Ambulatory referral to Spine [facility]    2. Chronic pain of both knees  Her knee pain has been going on for couple of months as well.  She like to see physical therapy and I think that would be beneficial.  - Ambulatory referral to PT/OT   "     No follow-ups on file.     History of Present Illness   This 39 y.o. old female is evaluated with a telephone visit today.  She notes that for the last couple of months she has had pain in the mid thoracic back.  It just is around her shoulder blade and there is a shooting pain with it.  Its a constant pain.  She is tried anti-inflammatory medicine, ice, heat and none of these things have really helped.  She is not having any pulmonary symptoms and it does not radiate into her abdomen.  She also notes that her knees bother her quite a bit with certain exercises.  That also does not seem to be getting better.  She has no other acute concerns today.    Review of Systems: As above, systems otherwise reviewed and negative.     Medications, Allergies and Problem List   Patient Active Problem List   Diagnosis     JUANPABLO (generalized anxiety disorder)     Suicidal ideation     Depression with anxiety     Sleep difficulties     Alcoholism /alcohol abuse (H)     Current Outpatient Medications   Medication Sig Dispense Refill     escitalopram oxalate (LEXAPRO) 10 MG tablet Take 1 tablet (10 mg total) by mouth daily. 30 tablet 0     gabapentin (NEURONTIN) 300 MG capsule Take 1 capsule (300 mg total) by mouth at bedtime. 30 capsule 0     hydrOXYzine pamoate (VISTARIL) 25 MG capsule Take 1 capsule (25 mg total) by mouth 3 (three) times a day as needed for anxiety. 30 capsule 0     multivitamin therapeutic tablet Take 1 tablet by mouth daily. 30 tablet 0     QUEtiapine (SEROQUEL) 25 MG tablet Take 0.5 tablets (12.5 mg total) by mouth 3 (three) times a day as needed (anxiety). 30 tablet 0     traZODone (DESYREL) 50 MG tablet Take 1 tablet (50 mg total) by mouth at bedtime. 30 tablet 0     nicotine polacrilex (COMMIT) 2 MG lozenge Apply 1 lozenge (2 mg total) to the mouth or throat every hour as needed for smoking cessation.  0     thiamine 100 MG tablet Take 1 tablet (100 mg total) by mouth daily.  0     No current  facility-administered medications for this visit.      No Known Allergies       Physical Exam     There were no vitals taken for this visit.    During our discussion there was no evidence of shortness of breath.     Additional Information   Social History     Tobacco Use     Smoking status: Former Smoker     Types: Cigarettes     Smokeless tobacco: Never Used     Tobacco comment: one or two cigs a day    Substance Use Topics     Alcohol use: Not Currently     Drug use: No              Funmi Anaya MD        Phone call duration:  10 minutes    Melany Simental CMA

## 2021-06-07 NOTE — PATIENT INSTRUCTIONS - HE
1.  Trial naproxen 500 mg twice daily as needed for pain    2.  Trial of 5 to 10 mg of baclofen twice a day as needed for muscle pain.  This can make you drowsy start at bedtime.    3.  Start physical therapy for some  neck and shoulder range of motion and strengthening.    4.  Follow-up via video visit in 3 weeks.

## 2021-06-07 NOTE — PROGRESS NOTES
"Tracy Davis is a 39 y.o. female who is being evaluated via a billable video visit.      The patient has been notified of following:     \"This video visit will be conducted via a call between you and your physician/provider. We have found that certain health care needs can be provided without the need for an in-person physical exam.  This service lets us provide the care you need with a video conversation.  If a prescription is necessary we can send it directly to your pharmacy.  If lab work is needed we can place an order for that and you can then stop by our lab to have the test done at a later time.    Video visits are billed at different rates depending on your insurance coverage. Please reach out to your insurance provider with any questions.    If during the course of the call the physician/provider feels a video visit is not appropriate, you will not be charged for this service.\"    Patient has given verbal consent to a Video visit? Yes    Patient would like to receive their AVS by AVS Preference: Mail a copy.    Patient would like the video invitation sent by: Text to cell phone: [phone]    Will anyone else be joining your video visit? No        Video Start Time: 1:20 PM    Additional provider notes:     Assessment/Plan:      There are no diagnoses linked to this encounter.    Assessment: Pleasant 39 y.o. female with a history of anxiety depression and alcohol abuse with:    1.  2-month history of lower cervical spine and left parascapular pain about the shoulder blade consistent with either myofascial pain versus proximal cervical radicular pain.      Discussion:    1.  We discussed the diagnosis and treatment options.  We discussed options of medications therapy and even imaging.  At this time we will start with more conservative management.    2.  We will provide trial of naproxen 500 mg twice daily as needed for pain.    3.  Trial baclofen 5 to 10 mg twice daily as needed for myofascial " component.    4.  Would like her to start with physical therapy for some parascapular strengthening and range of motion exercises order placed.    5.  Follow-up 3 weeks via video visit.  Can consider further imaging at that time of the cervical spine if no benefit/improvement in her symptoms.      It was our pleasure caring for your patient today, if there any questions or concerns please do not hesitate to contact us.      Subjective:   Patient ID: Tracy Davis is a 39 y.o. female.    History of Present Illness: The patient presents for an evaluation of left lower cervical spine and parascapular pain.  This started 2 months ago without any injury.  She points to the left upper trapezius and along the medial aspect of the scapula and into the lower cervical spine and the left.  She reports over the past 2 months it is remained fairly consistent although waxes and wanes in intensity.  She has no radiation down her arms paresthesias or weakness.  Her pain is best in the morning worse at the end of the day and with certain activities such as working on her computer or holding her phone in a certain position.  It is a sharp stabbing pain at the end of the day.  Better with changing position.  She uses ice 3 times a day does yoga and also pushes on the parascapular region with a tennis ball.  She rates her pain at 10/10 at worst 7/10 today 3/10 at best.  Has taken ibuprofen with no benefit and also seen a chiropractor.      Imaging: None available         Review of Systems: Denies GI distress bowel or bladder incontinence, balance changes.  She has no fevers, headaches, change in vision, chest pain, shortness of breath, nausea, vomiting, urinary issues, rashes or itching, balance problems.  She does have some poor sleep related to anxiety and depression and she bruises easily. Remainder of 12 point review systems negative unless listed above.    Past Medical History:   Diagnosis Date     Anxiety      Depression       Fracture of right distal radius        Family History   Problem Relation Age of Onset     Breast cancer Mother 52     Hypertension Father          Social History     Socioeconomic History     Marital status: Single     Spouse name: None     Number of children: None     Years of education: None     Highest education level: None   Occupational History     Occupation: Service Industry   Social Needs     Financial resource strain: None     Food insecurity     Worry: None     Inability: None     Transportation needs     Medical: None     Non-medical: None   Tobacco Use     Smoking status: Former Smoker     Types: Cigarettes     Smokeless tobacco: Never Used     Tobacco comment: one or two cigs a day    Substance and Sexual Activity     Alcohol use: Not Currently     Drug use: No     Sexual activity: Yes     Partners: Male   Lifestyle     Physical activity     Days per week: None     Minutes per session: None     Stress: None   Relationships     Social connections     Talks on phone: None     Gets together: None     Attends Sabianist service: None     Active member of club or organization: None     Attends meetings of clubs or organizations: None     Relationship status: None     Intimate partner violence     Fear of current or ex partner: None     Emotionally abused: None     Physically abused: None     Forced sexual activity: None   Other Topics Concern     None   Social History Narrative     None     She works as a .  She is out of work currently.  She does exercise with yoga daily and walking.  She is currently not drinking alcohol has been a heavy drinker in the past.  No tobacco.  No drugs.    The following portions of the patient's history were reviewed and updated as appropriate: allergies, current medications, past family history, past medical history, past social history, past surgical history and problem list.      WHO 5: 20    NDI Score: 32      Objective:   Physical Exam:    There were no vitals  filed for this visit.  There is no height or weight on file to calculate BMI.        General:  Well-appearing male in no acute distress.  Pleasant,   cooperative, and interactive throughout the examination and interview.  HEENT: Head atraumatic normocephalic, sclera clear.  Skin: No rashes or lesions seen over the face.  Respirations unlabored.  MSK: Gait is nonantalgic.  Able to heel-toe stand without difficulty.    Range of motion: Appears to have full range of motion of the cervical spine flexion extension rotation.  More pain with looking to the right than left..  Extremities: Full range of motion of the shoulders and elbows actively.    Neurologic exam: Mental status: Patient is alert and oriented with normal affect.  Attention, knowledge, memory, and language are intact.  Normal coordination throughout the examination.     Manual muscle testing : Appears to have at least antigravity strength throughout the upper extremities with normal movements.        Video-Visit Details    Type of service:  Video Visit    Video End Time (time video stopped): 1442  Originating Location (pt. Location): Home    Distant Location (provider location):  [facility]     Platform used for Video Visit: Warren Duran DO

## 2021-06-08 NOTE — PATIENT INSTRUCTIONS - HE
1.  Continue with physical therapy and doing her home exercises regularly.    2.  Continue with naproxen and baclofen as needed    3.  Follow-up with me as needed if symptoms worsen or fail to improve

## 2021-06-08 NOTE — PROGRESS NOTES
[facility] Daily Progress     Patient Name: Tracy Davis  Date: 5/20/2020  Visit #: 3/6 through 8/10/2020  PTA visit #:    PRECAUTIONS: none  Referral Diagnosis:   M89.8X1 (ICD-10-CM) - Shoulder blade pain    M54.2 (ICD-10-CM) - Cervical spine pain       (6 visits max)  Referring provider: Neymar Duran DO    Referral Diagnosis: Chronic pain of both knees  Referring provider: Funmi Anaya MD    Visit Diagnosis:     ICD-10-CM    1. Pain of left scapula  M89.8X1    2. Acute neck pain  M54.2          Assessment:     Pt reports improving L periscapular pain with ex and MT.   Pt's bilat knee pain further assessed today, with signs/sx most consistent with patellofemoral dysfunction, in the setting of mvmt coordination impairments.    HEP/POC compliance is  good .  Patient is benefitting from skilled physical therapy and is making steady progress toward functional goals.  Patient is appropriate to continue with skilled physical therapy intervention, as indicated by initial plan of care.    Goal Status:  Pt. will demonstrate/verbalize independence in self-management of condition in : 4 weeks  Pt. will be independent with home exercise program in : 6 weeks;Comment  Comment:: for resolution of pain  Pt. will decrease use of medication for pain for improved quality of life in : 6 weeks  Patient will sit: 60 minutes;with no pain;in 4 weeks;Comment  Comment: for eating and doing computer work  Patient will transfer: sit/stand;for in/out of bed;with no pain;in 4 weeks    Pt will: be able to lift, reach, carry for household chores and ADLs without pain in 6 weeks.  Pt will: be able to squat, lunge for exercise without increased knee pain in 6 weeks.  Pt will: be able to descend stairs without knee pain in 6 weeks.      Plan / Patient Education:     Next visit: Continue MT per below and assess KTape effectiveness to L levator scap.  Review newly added knee ex, adding glute max  strengthening.    Subjective:     Pain Ratin/10 at rest, L levator scap  Reports her L periscapular pain is already getting a little better. Still intermittent. Described as sharp, stabbing, but not as bad as last week. Doing HEP regularly, and finding them to be really helpful for the most part, especially thread the needle exercise.    Re: her knee pain, reviewed from evaluation it is intermittent. Located anterior patella, bilat. Worse with sit>stand from low bed, squats, lunges, going down stairs. Occasionally with some cracking noise, but not all the time and not terrible.    Objective:     Reviewed HEP for L periscapular pain with cueing for correct performance. Mildly increased muscle spasm and limited soft tissue mobility present L levator scap; improved from previous visit and with MT.  Pt education provided on benefits, mechanism, use, and precautions with KTape.    Re: knees, further objective examination performed, as this was limited with previous video visit.  Knee AROM WNL and NP bilat. Prone knee bend WNL bilat.  SLR: 90* bilat.  Passive patellar mobility WNL and NP bilat.  Mild dynamic genu valgum noted with squat and lunges, increased with small SL squat from step.  Foot assessment: Despite bilat bunions, patient maintains good foot positioning and without excessive pes planus in static standing or with mvmt.  Lower Extremity Strength:  Date:      LE strength/5 Right Left Right Left Right Left   Hip Flexion (L1-3)         Hip Extension (L5-S1) 4+ 4+       Hip Abduction (L4-5) 4+ 4+       Hip Adduction (L2-3)         Hip External Rotation         Hip Internal Rotation         Knee Extension (L3-4) 5 5       Knee Flexion 5 5       Ankle Dorsiflexion (L4-5) 5 5       Great Toe Extension (L5)         Ankle Plantar flexion (S1)         Abdominals          Exercises:  Exercise #1: Levator scap stretch - h  Comment #1: discontinued  Exercise #2: Thread the needle - H  Comment #2: 3x3-5 sec  "bilat  Exercise #3: Prone Is - H  Comment #3: 10x3 sec  Exercise #4: Foam roller/rolled up yoga mat mobility ex - H  Comment #4: T lying x2 min, segmental extension x10, alternating UE flexion x10, scap pro- and retraction x10  Exercise #5: Standing quad stretch - H  Comment #5: x30 sec B  Exercise #6: TB Sidestepping - H  Comment #6: 2x10 ft L/R with GTB  Exercise #7: PPT with alternating LE extension - H  Comment #7: 10x2 sec bilat (progress to bridge position as able)      Treatment Today     TREATMENT MINUTES COMMENTS   Evaluation     Self-care/ Home management     Manual therapy 8 -Prone STM to L levator scap  -Supine sustained stretches to L upper trap and levator scap  -Seated active release to L upper trap (cervical flexion > R ROT with sustained pressure at levator scap scapular origin) 2 sets of 5   Neuromuscular Re-education     Therapeutic Activity     Therapeutic Exercises 54 Ex per flow sheet    KTape 2 \"I\" strips for L levator scap inhibition   Gait training     Modality__________________                Total 62    Blank areas are intentional and mean the treatment did not include these items.       Nain Quintanilla  5/20/2020    "

## 2021-06-08 NOTE — PROGRESS NOTES
[facility] Daily Progress     Patient Name: Tracy Davis  Date: 5/27/2020  Visit #: 4/6 through 8/10/2020  PTA visit #:    PRECAUTIONS: none  Referral Diagnosis:   M89.8X1 (ICD-10-CM) - Shoulder blade pain    M54.2 (ICD-10-CM) - Cervical spine pain       (6 visits max)  Referring provider: Neymar Duran DO    Referral Diagnosis: Chronic pain of both knees  Referring provider: Funmi Anaya MD    Visit Diagnosis:     ICD-10-CM    1. Pain of left scapula  M89.8X1    2. Acute neck pain  M54.2    3. Pain in both knees, unspecified chronicity  M25.561     M25.562          Assessment:     Pt reports improving L periscapular pain with ex, MT, and KTpae.  Pt's bilat knee pain most consistent with patellofemoral dysfunction, in the setting of mvmt coordination impairments. No overall change in pain or function noted to this point.    HEP/POC compliance is  good .  Patient is benefitting from skilled physical therapy and is making steady progress toward functional goals.  Patient is appropriate to continue with skilled physical therapy intervention, as indicated by initial plan of care.    Goal Status:  Pt. will demonstrate/verbalize independence in self-management of condition in : 4 weeks  Pt. will be independent with home exercise program in : 6 weeks;Comment  Comment:: for resolution of pain  Pt. will decrease use of medication for pain for improved quality of life in : 6 weeks  Patient will sit: 60 minutes;with no pain;in 4 weeks;Comment  Comment: for eating and doing computer work  Patient will transfer: sit/stand;for in/out of bed;with no pain;in 4 weeks    Pt will: be able to lift, reach, carry for household chores and ADLs without pain in 6 weeks.  Pt will: be able to squat, lunge for exercise without increased knee pain in 6 weeks.  Pt will: be able to descend stairs without knee pain in 6 weeks.      Plan / Patient Education:     Next visit: Continue MT and KTape per below.  Add prone  "Ws/Ts as tolerated.  Review bridges with SL kicks.    Subjective:     Pain Ratin/10 at rest, L levator scap  Although without much change since last visit, which she relates to not doing her exercises over the weekend, she does report her L periscapular pain is much better overall. KTape stayed on until Saturday and did feel to be helpful. Denies itching or skin irritation with use.  Bilat knee pain unchanged. Still worse with going down stairs, squats/lunges, and getting up from bed (which is low to the floor).    Objective:     Mildly increased muscle spasm present L levator scap; improved with MT.  Skin clear under previous KTape application; reapplied today.    Exercises:  Exercise #1: Levator scap stretch - h  Comment #1: discontinued  Exercise #2: Thread the needle - H  Comment #2: verbal review  Exercise #3: Prone Is - H  Comment #3: verbal review  Exercise #4: Foam roller/rolled up yoga mat mobility ex - H  Comment #4: T lying x2 min, segmental extension x10, alternating UE flexion x10, scap pro- and retraction x10 - verbal review  Exercise #5: Standing quad stretch - H  Comment #5: verbal review  Exercise #6: TB Sidestepping - H  Comment #6: 3x10 ft L/R with GTB  Exercise #7: Bridges with alternating LE extension  Comment #7: 2x10 reps      Treatment Today     TREATMENT MINUTES COMMENTS   Evaluation     Self-care/ Home management     Manual therapy 25 -Prone STM to L levator scap  -Supine sustained stretches to L upper trap and levator scap  -Seated active release to L upper trap (cervical flexion > R ROT with sustained pressure at levator scap scapular origin) 2 sets of 5   Neuromuscular Re-education     Therapeutic Activity     Therapeutic Exercises 15 Ex per flow sheet    KTape 2 \"I\" strips for L levator scap inhibition   Gait training     Modality__________________                Total 40    Blank areas are intentional and mean the treatment did not include these items.       Nain" Socorro  5/27/2020     [facility] Discharge Summary  Patient Name: Tracy Davis  Date: 6/29/2020  Referral Diagnosis: [unfilled]  Referring provider: Funmi Anaya MD  Visit Diagnosis:   1. Pain of left scapula     2. Acute neck pain     3. Pain in both knees, unspecified chronicity         Goals:  Pt. will demonstrate/verbalize independence in self-management of condition in : 4 weeks. Progressing  Pt. will be independent with home exercise program in : 6 weeks;Comment  Comment:: for resolution of pain. Progressing  Pt. will decrease use of medication for pain for improved quality of life in : 6 weeks. Not Assessed  Patient will sit: 60 minutes;with no pain;in 4 weeks;Comment  Comment: for eating and doing computer work. Progressing  Patient will transfer: sit/stand;for in/out of bed;with no pain;in 4 weeks. Not Met  Pt will: be able to lift, reach, carry for household chores and ADLs without pain in 6 weeks. Progressing  Pt will: be able to squat, lunge for exercise without increased knee pain in 6 weeks. Not Met  Pt will: be able to descend stairs without knee pain in 6 weeks. Not Met      Patient was seen for 5 visits from 4/29/20 to 5/7/20 with 0 missed appointments.  Overall, patient reports L periscapular pain to be improving well; but no change in knee pain.  She had planned to be continuing to be seen at the [facility]; however, due to recent closure and lack of transportation, she will be transferring her care to the [facility].    Therapy will be discontinued at this time.  The patient will need a new referral to resume.    Thank you for your referral.  Nain Socorro  6/29/2020  1:22 PM

## 2021-06-08 NOTE — PROGRESS NOTES
Tracy Davis is a 39 y.o. female who is being evaluated via a billable telephone visit.        Telephone visit   Tracy Davis   39 y.o. female    Date of Visit: 5/28/2020    Chief Complaint   Patient presents with     Contraception        Assessment and Plan   1. General counseling for prescription of oral contraceptives  She is interested in getting an IUD placed and I am going to refer her to gynecology.  - Ambulatory referral to Gynecology       No follow-ups on file.     History of Present Illness   This 39 y.o. old female is evaluated with a telephone visit today.  She is interested in getting an IUD placed.  She is hoping to have a type of contraception that does not have a period.  She has no other acute concerns today.  She has been continuing with her outpatient treatment and has a new psychiatrist which is been helpful.    Review of Systems: As above, systems otherwise reviewed and negative.     Medications, Allergies and Problem List   Patient Active Problem List   Diagnosis     JUANPABLO (generalized anxiety disorder)     Suicidal ideation     Depression with anxiety     Sleep difficulties     Alcoholism /alcohol abuse (H)     Current Outpatient Medications   Medication Sig Dispense Refill     baclofen (LIORESAL) 10 MG tablet Take 0.5-1 tablets (5-10 mg total) by mouth 2 (two) times a day as needed (for muscle spasms). 30 tablet 0     escitalopram oxalate (LEXAPRO) 10 MG tablet Take 1 tablet (10 mg total) by mouth daily. 30 tablet 0     multivitamin therapeutic tablet Take 1 tablet by mouth daily. 30 tablet 0     naproxen (NAPROSYN) 500 MG tablet Take 1 tablet (500 mg total) by mouth 2 (two) times a day as needed (for pain.  Take with food.). 60 tablet 2     QUEtiapine (SEROQUEL) 25 MG tablet Take 0.5 tablets (12.5 mg total) by mouth 3 (three) times a day as needed (anxiety). 30 tablet 0     traZODone (DESYREL) 50 MG tablet Take 1 tablet (50 mg total) by mouth at bedtime. 30 tablet 0     No current  "facility-administered medications for this visit.      No Known Allergies       Physical Exam     There were no vitals taken for this visit.    During our discussion there was no evidence of shortness of breath.     Additional Information   Social History     Tobacco Use     Smoking status: Former Smoker     Types: Cigarettes     Smokeless tobacco: Never Used     Tobacco comment: one or two cigs a day    Substance Use Topics     Alcohol use: Not Currently     Drug use: No            Funmi Anaya MD      The patient has been notified of following:     \"This telephone visit will be conducted via a call between you and your physician/provider. We have found that certain health care needs can be provided without the need for a physical exam.  This service lets us provide the care you need with a short phone conversation.  If a prescription is necessary we can send it directly to your pharmacy.  If lab work is needed we can place an order for that and you can then stop by our lab to have the test done at a later time.    Telephone visits are billed at different rates depending on your insurance coverage. During this emergency period, for some insurers they may be billed the same as an in-person visit.  Please reach out to your insurance provider with any questions.    If during the course of the call the physician/provider feels a telephone visit is not appropriate, you will not be charged for this service.\"    Patient has given verbal consent to a Telephone visit? Yes    What phone number would you like to be contacted at? [phone]    Patient would like to receive their AVS by AVS Preference: Almita.          Phone call duration:  7 minutes    Melany Simental CMA  "

## 2021-06-08 NOTE — PROGRESS NOTES
"Tracy Davis is a 39 y.o. female who is being evaluated via a billable video visit.      The patient has been notified of following:     \"This video visit will be conducted via a call between you and your physician/provider. We have found that certain health care needs can be provided without the need for an in-person physical exam.  This service lets us provide the care you need with a video conversation.  If a prescription is necessary we can send it directly to your pharmacy.  If lab work is needed we can place an order for that and you can then stop by our lab to have the test done at a later time.    Video visits are billed at different rates depending on your insurance coverage. Please reach out to your insurance provider with any questions.    If during the course of the call the physician/provider feels a video visit is not appropriate, you will not be charged for this service.\"    Patient has given verbal consent to a Video visit? Yes    Patient would like to receive their AVS by AVS Preference: Almita.    Patient would like the video invitation sent by: Text to cell phone: [phone]    Will anyone else be joining your video visit? No        Video Start Time: 11:34 AM    Additional provider notes:     Assessment/Plan:      Diagnoses and all orders for this visit:    Cervical spine pain    Shoulder blade pain        Assessment: Pleasant 39 y.o. femalewith a history of anxiety depression and alcohol abuse with:     1.  2-month history of lower cervical spine and left parascapular pain about the shoulder blade consistent with either myofascial pain versus proximal cervical radicular pain.  She is doing much better with physical therapy home exercises and naproxen and baclofen as needed.    Discussion:    1.  She has had market improvement with just 2 visits of therapy along with as needed naproxen and baclofen.  We discussed continue with therapy along with option such as further imaging such as an " MRI of the cervical spine.  She like to continue for now.    2.  Continue physical therapy and home exercises.    3.  Continue naproxen and baclofen as needed.    4.  Follow-up with me as needed.      It was our pleasure caring for your patient today, if there any questions or concerns please do not hesitate to contact us.      Subjective:   Patient ID: Tracy Davis is a 39 y.o. female.    History of Present Illness: Patient presents for follow-up of cervical spine and left parascapular pain.  She has had fairly significant improvement since her last visit.  She is working with physical therapy.  She is working with [facility] and has had her second visit yesterday.  She has been doing her home exercises and stretches daily.  He used Kinesiotape yesterday.  She notes fairly dramatic improvement of her symptoms and her pain is only a 4/10 today.  If her pain does worsen she can takes naproxen and baclofen up to once per day sometimes together sometimes separate and overall she is very happy with her progress thus far.  She has no radiation down her arms paresthesias or weakness.    Imaging: None available.    Review of systems: No numbness, tingling or weakness.  No bowel or bladder incontinence.  No urinary retention.  No fevers, unintentional weight loss, balance changes, headaches, frequent falling, difficulty swallowing, or coordination difficulties.      Prior interventions:  Sickle therapy    Past Medical History:   Diagnosis Date     Anxiety      Depression      Fracture of right distal radius        The following portions of the patient's history were reviewed and updated as appropriate: allergies, current medications, past family history, past medical history, past social history, past surgical history and problem list.           Objective:   Physical Exam:    There were no vitals filed for this visit.  There is no height or weight on file to calculate BMI.        General:  Well-appearing male in no acute  distress.  Pleasant,   cooperative, and interactive throughout the examination and interview.  HEENT: Head atraumatic normocephalic, sclera clear.  Skin: No rashes or lesions seen over the face.  Respirations unlabored.  MSK: Gait is nonantalgic.  Full range of motion cervical spine flexion extension rotation bilaterally extremities:   Full shoulder abduction.     Neurologic exam: Mental status: Patient is alert and oriented with normal affect.  Attention, knowledge, memory, and language are intact.  Normal coordination throughout the examination.     Manual muscle testing : Appears to have at least antigravity strength throughout the upper extremities with normal movements.         Video-Visit Details    Type of service:  Video Visit    Video End Time (time video stopped): 11:40 AM  Originating Location (pt. Location): Home    Distant Location (provider location):  [facility]     Platform used for Video Visit: Warren Duran DO

## 2021-06-08 NOTE — TELEPHONE ENCOUNTER
PT called patient for status check in. Due to transportation issues, is not able to continue to follow-up with primary PT at [facility]. Options providing, including transfer of care to [facility] (likely [facility] location), which patient is most agreeable to at this time.    Catalino Quintanilla, PT, DPT

## 2021-06-12 NOTE — PATIENT INSTRUCTIONS - HE
[facility] AND [facility]  Male Inpatient Consult    [address] [facility] Patient Status:  Observation    1963 MRN [id number]   Location [address] [facility] Attending Isabel Santana. [address] Day # 0 PCP Kermit Bucrh.  DO Darline     DATE OF ADMISSION: 2017    [facility] Surgery Information    **ALL [facility] PAPERWORK IS TO BE FAXED -[phone], IT WILL BE PROCESSED AFTER SURGERY IS COMPLETE.      Surgery Date     Surgery Time   ( Arrive at the hospital two hours prior to your surgery and 1 hour prior at the surgery center. Check in at the . The only exception is if you are scheduled for surgery at 7am, you should arrive at 5:30am)    IF YOU NEED TO RESCHEDULE OR CANCEL YOUR SURGERY FOR ANY REASON PLEASE CALL THE CLINIC AS SOON AS POSSIBLE [phone].    Admission Type: Outpatient    Surgeon: Dr. Shepherd    Surgery Procedure:  Lapidus Bunionectomy right foot     Surgery Location: [facility], [address] [place] [address], [address]    Anticoagulation Schedule      1. Plavix:    2. Aspirin:    3. Other:     Additional Information: If you use a CPAP machine for sleep apnea please bring it with you for surgery. We will want to monitor your breathing using your normal equipment.   If you need to reach the surgery scheduler please call the main number at [phone] and ask for Roxie for Dr. Shepherd    [facility] SURGERY CENTER INFORMATION    We need to know a lot of information about you before surgery.     1-5 Days Before:     A nurse will call you for a pre-screening interview. The phone call with the nurse will be much faster and easier if you  Have organized your information prior to the call.     Please have the following information available:    Preoperative Exam completed and faxed to our office    Primary care provider's and any specialty providers' contact information available. .    If requested by your primary care provider, have any heart and lung exams at least 3 days before surgery.    Have a complete and accurate list of medications available.     Have a list of your allergies/sensitivities and reactions    Know your health history, surgical history, and medical problems    Know any anesthesia issues with you or within your family.     BE SURE TO  NOTIFY US IF YOU ARE TAKING ANY BLOOD THINNING AGENTS: Coumadin, Plavix, Aspirin, Xarelto, Eliquis    Someone planned to bring you and stay with you after the surgery    Day Before Surgery    1. STOP Smoking and Drinking: It is important to stop smoking and drinking at least 24 hours before surgery. Smoking and drinking may cause complications in your recovery from anesthesia and may lengthen the healing process.    2. Pack for your hospital stay: If it will be required for you to stay at the hospital after surgery, bring personal items such as a robe, slippers, pajamas, additional clothing and toiletries. Don't forget:    List of medication    Eyeglass case or contact case with solution. You cannot wear contacts during surgery    Copies of your physical exam , lab results and EKG    Copy of Health Care Directives, Living Will or Power of     Insurance Cards    Photo ID    CPAP machine    3. NOTHING BY MOUTH 8 HOURS BEFORE. This includes gum, hard candy, water and mints. The only exception is if you have been instructed by your doctor to take your medications with sips of water. You may rinse your mouth or brush your teeth, but do not swallow water.     4. Remove Nail Polish  Day of Surgery    1. Medications- Take as indicated with sips of water     2. Wear comfortable loose fitting clothes. Wear your glasses-Not contacts. Do not wear jewelry and remove body piercing's. Surgery may be cancelled if they are not removed.     3. If same day surgery-Have a someone come with you to surgery that can help you understand the surgeon's instructions, drive you home and stay with you overnight the first night.     4. A nurse will call you at home within 72 hours following surgery to see how you are doing. Our nurses and doctors will discuss recovery with you.      The surgery scheduler Roxie Shepherd will contact you to schedule if you were not scheduled today or you need to make any adjustments to your  prostate surgery. PAST SURGICAL HISTORY:     1 cystoscopy stent lithotripsy approximately 15 years ago    PAST MEDICAL HISTORY:     None    REVIEW OF SYSTEMS:  1. The patient denies constitutional symptoms of high fever or weight loss.    2.   No histo HCl PF (DILAUDID) 1 MG/ML injection 0.4 mg 0.4 mg Intravenous Q2H PRN   Or      HYDROmorphone HCl PF (DILAUDID) 1 MG/ML injection 0.8 mg 0.8 mg Intravenous Q2H PRN   docusate sodium (COLACE) cap 100 mg 100 mg Oral BID   PEG 3350 (MIRALAX) powder packet 17 surgery.     You will need a preop physical within 30 days before surgery with your primary care provider. Please note if you do not get a complete History and Physical your surgery will be cancelled.   Please contact your primary to schedule.     A nurse should contact you from the hospital 1-2 days before surgery to review with you.    If you would like a [facility] Estimate for your upcoming service/procedure contact Cost of Care Estimates at [phone], advocates are available Monday through Friday 8am - 5pm. You may also submit a request online at [email or web address]    [facility]  [address]  [place]  [address]     [phone] for facility charge    Anesthesiology charge  [phone]          Please don't hesitate to call us with any additional question or problems  Our number is [phone]     Instructions for Patients Scheduled for Vascular or Podiatry Procedures During the COVID 19 Pandemic    Your Provider has determined that your condition warrants going ahead with your procedure at this time.  You will need to be tested for COVID19 within 72 hours of your procedure. We highly encourage patients to get tested for COVID-19 at one of our designated [facility] testing sites. We process the tests in our lab, which allows us to get the results quickly. If you choose to get tested at a non-[facility] location, you will need to contact your primary care provider to make those arrangements and ensure the results are available to your surgeon before you are arriving for your procedure. If we do not receive the results in time, your procedure may be postponed or canceled.  Please make sure your test is collected 3-days prior to your procedure date.  The results will need to get faxed to [phone].  After testing, you will need to remain in self-quarantine until your procedure.  If you are notified of a  developed. Testicles are descended bilaterally and normal in size, position and consistency. Epididymis, vas and cord structures are intact. There are no hernias bilaterally. Rectal exam shows normal perineum, good rectal tone, no rectal masses.   Pro positive COVID-19 result; you will need to call your provider for further recommendations.    Please call [phone] and press option 2 to speak to a nurse.  If the test is positive; DO NOT PRESENT FOR YOUR PROCEDURE until you have been given further instructions.  You will not be called with negative results so arrive as instructed unless otherwise notified.  Don't:    Don't invite visitors or friends into your home.    Don't leave your home unless absolutely necessary.    Don't share utensils and other household items with others in the home.  Do:    Wash your hands regularly with soap and water and use hand  with at least 60% alcohol if you don't have easy access to soap and water.     Disinfect surface areas daily, including doorknobs, electronics - especially phones, laptops and other devices.     Wash utensils and other items thoroughly.     Separate yourself from others in the household as best you can, including pets.    Keep your hands away from your face.     Practice all other prevention tips the CDC recommends, including covering your coughs and sneezes with a tissue or your sleeve and immediately throwing the tissue into the trash and washing your hands.    Call your hospital if you develop the following signs before your surgery:    Fever.    Cough.    Shortness of breath.    Sore throat.    Runny or stuffy nose.    Muscle or body aches.    Headaches.    Fatigue.    Vomiting and diarrhea.    These steps will help keep you & your family, other patients, and hospital staff safe from COVID19.              evidence to suggest acute cholecystitis. No biliary ductal dilatation. 7. Small hiatal hernia 8. Small umbilical and right inguinal hernias containing fat            PROBLEM LIST:    Kidney stone  Reason for admission        ASSESSMENT:  #1 renal colic  2.

## 2021-06-12 NOTE — PROGRESS NOTES
FOOT AND ANKLE SURGERY/PODIATRY CONSULT NOTE         ASSESSMENT:   Hallux abductovalgus both feet      TREATMENT:  X-rays of both feet were taken today.  I informed the patient she would require a Lapidus bunionectomy to correct her painful bunion deformities of both feet.  The procedure was described to the patient in complete detail.  She was told that the procedure is performed on an outpatient basis under local anesthesia with IV sedation.  She was told the procedure takes approximately 45 minutes to perform and then she would be discharged nonweightbearing for 6 weeks.  I have asked the patient to go n.p.o. at midnight prior to the procedure and she was asked to see her primary care physician for preoperative consultation.  All pertinent questions were answered.  The patient elected to have the right foot done initially.  She was told the left foot would be done approximately 6 to 7 weeks following the initial procedure.        HPI: Tracy Davis presented to the clinic today complaining of painful bunions both feet.  The patient stated that she has had these painful bunions for several years.  She is extremely active.  She practices yoga daily.  The pain involving the bunions is moderate to severe.  She has difficulty wearing shoes because of the pain.  She denies any redness or swelling surrounding the big toe joint.  She denies trauma to her feet.  She denies any other previous treatment.    Past Medical History:   Diagnosis Date     Anxiety      Depression      Fracture of right distal radius        Past Surgical History:   Procedure Laterality Date     FOREARM FRACTURE SURGERY Right 7/9/2018    Procedure: RIGHT DISTAL RADIUS FRACTURE OPEN REDUCTION INTERNAL FIXATION;  Surgeon: Rudy Lovell MD;  Location: [facility];  Service:      WISDOM TOOTH EXTRACTION         No Known Allergies      Current Outpatient Medications:      baclofen (LIORESAL) 10 MG tablet, Take 0.5-1 tablets (5-10 mg total)  by mouth 2 (two) times a day as needed (for muscle spasms)., Disp: 30 tablet, Rfl: 0     escitalopram oxalate (LEXAPRO) 10 MG tablet, Take 1 tablet (10 mg total) by mouth daily., Disp: 30 tablet, Rfl: 0     multivitamin therapeutic tablet, Take 1 tablet by mouth daily., Disp: 30 tablet, Rfl: 0     naproxen (NAPROSYN) 500 MG tablet, Take 1 tablet (500 mg total) by mouth 2 (two) times a day as needed (for pain.  Take with food.)., Disp: 60 tablet, Rfl: 2     QUEtiapine (SEROQUEL) 25 MG tablet, Take 0.5 tablets (12.5 mg total) by mouth 3 (three) times a day as needed (anxiety)., Disp: 30 tablet, Rfl: 0     traZODone (DESYREL) 50 MG tablet, Take 1 tablet (50 mg total) by mouth at bedtime., Disp: 30 tablet, Rfl: 0    Family History   Problem Relation Age of Onset     Breast cancer Mother 52     Hypertension Father        Social History     Socioeconomic History     Marital status: Single     Spouse name: Not on file     Number of children: Not on file     Years of education: Not on file     Highest education level: Not on file   Occupational History     Occupation: Service Industry   Social Needs     Financial resource strain: Not on file     Food insecurity     Worry: Not on file     Inability: Not on file     Transportation needs     Medical: Not on file     Non-medical: Not on file   Tobacco Use     Smoking status: Former Smoker     Types: Cigarettes     Smokeless tobacco: Never Used     Tobacco comment: one or two cigs a day    Substance and Sexual Activity     Alcohol use: Not Currently     Drug use: No     Sexual activity: Yes     Partners: Male   Lifestyle     Physical activity     Days per week: Not on file     Minutes per session: Not on file     Stress: Not on file   Relationships     Social connections     Talks on phone: Not on file     Gets together: Not on file     Attends Jew service: Not on file     Active member of club or organization: Not on file     Attends meetings of clubs or organizations:  Not on file     Relationship status: Not on file     Intimate partner violence     Fear of current or ex partner: Not on file     Emotionally abused: Not on file     Physically abused: Not on file     Forced sexual activity: Not on file   Other Topics Concern     Not on file   Social History Narrative     Not on file       Review of Systems - Patient denies fever, chills, rash, wound, stiffness, limping, numbness, weakness, heart burn, blood in stool, chest pain with activity, calf pain when walking, shortness of breath with activity, chronic cough, easy bleeding/bruising, swelling of ankles, excessive thirst, fatigue, depression, anxiety.  Patient admits to painful bunions both feet.      OBJECTIVE:  Appearance: alert, well appearing, and in no distress.    There were no vitals filed for this visit.    BMI= There is no height or weight on file to calculate BMI.    General appearance: Patient is alert and fully cooperative with history & exam.  No sign of distress is noted during the visit.  Psychiatric: Affect is pleasant & appropriate.  Patient appears motivated to improve health.  Respiratory: Breathing is regular & unlabored while sitting.  HEENT: Hearing is intact to spoken word.  Speech is clear.  No gross evidence of visual impairment that would impact ambulation.    Vascular: Dorsalis pedis and posterior tibial pulses are palpable. There is pedal hair growth bilaterally.  CFT < 3 sec from anterior tibial surface to distal digits bilaterally. There is no appreciable edema noted.  Dermatologic: Turgor and texture are within normal limits. No coloration or temperature changes. No primary or secondary lesions noted.  Neurologic: All epicritic and proprioceptive sensations are grossly intact bilaterally.  Musculoskeletal: All active and passive ankle, subtalar, midtarsal joint range of motion are grossly intact without pain or crepitus, with the exception of the first metatarsal phalangeal joint bilateral feet.   There is hypermobility noted at the base of the first metatarsocuneiform joint.  Manual muscle strength is within normal limits bilaterally. All dorsiflexors, plantarflexors, invertors, evertors are intact bilaterally. Tenderness present to the first metatarsal phalangeal joint bilaterally on palpation. Tenderness to the first metatarsophalangeal joint bilaterally with range of motion.  There is a large firm subcutaneous mass on the medial aspect of the head of the first metatarsal bilaterally.  There is severe lateral deviation of the great toe which buttresses the second toe bilaterally.  Calf is soft/non-tender without warmth/induration    Imaging:         No results found.       Anthony Shepherd; KUSUM  [facility] Foot & Ankle Surgery/Podiatry

## 2021-06-13 NOTE — PROGRESS NOTES
[facility]  [address]  [facility] PROFESSIONAL [facility] [address]  Dept: [phone]  Dept Fax: [phone]  Primary Provider: Funmi Anaya MD      PREOPERATIVE EVALUATION:  Today's date: 11/19/2020    Tracy Davis is a 40 y.o. female who presents for a preoperative evaluation.    Surgical Information:  Surgery/Procedure: Bunionectomy  Surgery Location: [facility]  Surgeon: Dr Shepherd  Surgery Date: 12/4/2020  Time of Surgery: 1000 am  Where patient plans to recover: At home with family  Fax number for surgical facility: Note does not need to be faxed, will be available electronically in Epic.    Type of Anesthesia Anticipated: to be determined    Subjective     HPI related to upcoming procedure: She is scheduled for bunion surgery on December 4.  She has no underlying cardiac or lung disease.  She is able to exercise without any chest pain or palpitations.  She has not had any recent symptoms of shortness of breath, chest pain, swelling or other worrisome symptoms.  No recent symptoms of infection.  She has no history of bleeding or blood clots.  She had wrist surgery in the past without any difficulties with anesthesia.  She does note some allergy symptoms recently and is interested in seeing an allergist.  Also notes deviated septum and would like to see ENT.    Preop Questions 11/19/2020   Have you ever had a heart attack or stroke? No   Have you ever had surgery on your heart or blood vessels, such as a stent placement, a coronary artery bypass, or surgery on an artery in your head, neck, heart, or legs? No   Do you have chest pain with activity? No   Do you have a history of  heart failure? No   Do you currently have a cold, bronchitis or symptoms of other infection? No   Do you have a cough, shortness of breath, or wheezing? No   Do you or anyone in your family have previous history of blood clots? No   Do you or does anyone in your  family have a serious bleeding problem such as prolonged bleeding following surgeries or cuts? No   Have you ever had problems with anemia or been told to take iron pills? No   Have you had any abnormal blood loss such as black, tarry or bloody stools, or abnormal vaginal bleeding? No   Have you ever had a blood transfusion? No   Are you willing to have a blood transfusion if it is medically needed before, during, or after your surgery? Yes   Have you or any of your relatives ever had problems with anesthesia? No   Do you have sleep apnea, excessive snoring or daytime drowsiness? No   Do you have any artifical heart valves or other implanted medical devices like a pacemaker, defibrillator, or continuous glucose monitor? No   Do you have artificial joints? No   Are you allergic to latex? No   Is there any chance that you may be pregnant? No       Preoperative Review of :    reviewed - controlled substances reflected in medication list.  No unexpected prescriptions are noted.      Review of Systems  CONSTITUTIONAL: NEGATIVE for fever, chills, change in weight  ENT/MOUTH: Negative for ear, mouth, and throat problems  RESP: NEGATIVE for significant cough or SOB  CV: NEGATIVE for chest pain, palpitations or peripheral edema    Patient Active Problem List    Diagnosis Date Noted     Hallux abductovalgus, right 11/03/2020     Suicidal ideation      Depression with anxiety      Sleep difficulties      Alcoholism /alcohol abuse (H)      JUANPABLO (generalized anxiety disorder) 03/15/2019     Past Medical History:   Diagnosis Date     Anxiety      Depression      Fracture of right distal radius      Past Surgical History:   Procedure Laterality Date     FOREARM FRACTURE SURGERY Right 7/9/2018    Procedure: RIGHT DISTAL RADIUS FRACTURE OPEN REDUCTION INTERNAL FIXATION;  Surgeon: Rudy Lovell MD;  Location: [facility] [place];  Service:      WISDOM TOOTH EXTRACTION       Current Outpatient Medications   Medication Sig  "Dispense Refill     baclofen (LIORESAL) 20 MG tablet        escitalopram oxalate (LEXAPRO) 20 MG tablet        methylphenidate HCl (METADATE CD) 10 MG CR capsule        multivitamin therapeutic tablet Take 1 tablet by mouth daily. 30 tablet 0     QUEtiapine (SEROQUEL) 25 MG tablet Take 0.5 tablets (12.5 mg total) by mouth 3 (three) times a day as needed (anxiety). 30 tablet 0     traZODone (DESYREL) 50 MG tablet Take 1 tablet (50 mg total) by mouth at bedtime. 30 tablet 0     No current facility-administered medications for this visit.        No Known Allergies    Social History     Tobacco Use     Smoking status: Former Smoker     Types: Cigarettes     Smokeless tobacco: Never Used     Tobacco comment: one or two cigs a day    Substance Use Topics     Alcohol use: Not Currently      Family History   Problem Relation Age of Onset     Breast cancer Mother 52     Hypertension Father      Social History     Substance and Sexual Activity   Drug Use No        Objective     BP 90/70 (Patient Site: Left Arm, Patient Position: Sitting)   Pulse 61   Temp 97  F (36.1  C)   Ht 5' 7.75\" (1.721 m)   Wt 137 lb (62.1 kg)   SpO2 97%   BMI 20.98 kg/m    Physical Exam  General:  Patient is alert and in no apparent distress.  Neck:  Supple with no adenopathy or thyroid abnormality noted.  Cardiovascular:  Regular rate and rhythm, normal S1/S2, no murmurs, rubs, or gallop.  Pulmonary:  Lungs are clear to auscultation bilaterally with normal respiratory effort.  Gastrointestinal:  Abdomen is soft, non-tender, non-distended, with no organomegaly, rebound or guarding.  Extremities:  No LE edema.   Neurologic Cranial nerves are intact.  No focal deficits.  Psychiatric:  Pleasant, no confusion or agitation           Recent Labs   Lab Test 02/23/20  1348 02/21/20  1000 02/20/20  1345 11/29/19  1421   HGB  --   --  13.6 11.8*     --  196 431   NA  --  134* 140 137   K  --  3.7 3.6 4.3   CREATININE  --  0.66 0.74 0.65    "     PRE-OP Diagnostics:   Labs pending at this time. Results will be reviewed when available.      REVISED CARDIAC RISK INDEX (RCRI)   The patient has the following serious cardiovascular risks for perioperative complications:   - No serious cardiac risks = 0 points    RCRI INTERPRETATION: 0 points: Class I (very low risk - 0.4% complication rate)         Assessment & Plan      The proposed surgical procedure is considered LOW risk.    1. Preop general physical exam  She can proceed with surgery at low risk.  She has no symptoms or signs of unstable cardiac or lung disease.  No history of bleeding or blood clots and no recent symptoms of infection.  I recommended that she hold her morning medication.  She has a history of depression and anxiety which has been well controlled.    2. Hallux abductovalgus, right  - HM2(CBC w/o Differential)  - Basic Metabolic Panel    3. Depression with anxiety    4. Seasonal allergic rhinitis, unspecified trigger  - Ambulatory referral to Allergy    5. Deviated nasal septum  - Ambulatory referral to ENT               RECOMMENDATION:  APPROVAL GIVEN to proceed with proposed procedure pending review of diagnostic evaluation.    Signed Electronically by: Funmi Anaya MD    Copy of this evaluation report is provided to requesting physician.    [facility] Pre[facility]    Revised Cardiac Risk Index

## 2021-06-13 NOTE — ANESTHESIA POSTPROCEDURE EVALUATION
Patient: Tracy Davis  Procedure(s):  BUNIONECTOMY, LAPIDUS (Right)  Anesthesia type: MAC    Patient location: Phase II Recovery  Last vitals:   Vitals Value Taken Time   BP 96/54 12/04/20 1112   Temp 36.9  C (98.5  F) 12/04/20 1112   Pulse 69 12/04/20 1159   Resp 16 12/04/20 1112   SpO2 100 % 12/04/20 1159   Vitals shown include unvalidated device data.  Post vital signs: stable  Level of consciousness: awake and responds to simple questions  Post-anesthesia pain: pain controlled  Post-anesthesia nausea and vomiting: no  Pulmonary: unassisted, return to baseline  Cardiovascular: stable and blood pressure at baseline  Hydration: adequate  Anesthetic events: no    QCDR Measures:  ASA# 11 - Cherelle-op Cardiac Arrest: ASA11B - Patient did NOT experience unanticipated cardiac arrest  ASA# 12 - Cherelle-op Mortality Rate: ASA12B - Patient did NOT die  ASA# 13 - PACU Re-Intubation Rate: NA - No ETT / LMA used for case  ASA# 10 - Composite Anes Safety: ASA10A - No serious adverse event    Additional Notes:

## 2021-06-13 NOTE — PATIENT INSTRUCTIONS - HE
Check allergy testing    Other options:  Montelukast, Astelin nasal spray,     Alan Med Sinus Rinse    Vaseline to the nose

## 2021-06-13 NOTE — PROGRESS NOTES
Subjective findings: The patient return to the clinic today for postop visit #1, 1 week status post Lapidus bunionectomy of the right foot.  The patient is in good spirits and she had no complaints.  Patient mentioned that she has been walking in the boot without crutches.    Objective findings: The dressings were removed and wound margins are well coaptated and maintained.  There is no edema no erythema no cellulitis no drainage or bleeding noted.  Neurovascular status is intact.  Vital signs stable.    Assessment: Hallux abductovalgus right foot    Plan: Applied a sterile dressing to the right foot.  The patient was instructed to return to the clinic in 1 week for postop visit #2 at which time the sutures will be removed and a postop x-ray will be taken.  Patient was told to remain nonweightbearing for the next 5 weeks.

## 2021-06-13 NOTE — PROGRESS NOTES
"Chief complaint: Allergies    History of present illness: This is a pleasant 40-year-old woman who I was asked to see by Dr. Anaya in regards to allergies.  Patient states that she has had seasonal allergies for some time now but lately she has noted that she will have symptoms at this time a year.  She states she wakes up in the morning with a stuffy nose.  Benadryl does seem to help.  Symptoms seem to worsen at night as well.  She does note drainage and sneezing as well.  No history of asthma.  Denies any cough, wheeze or shortness of breath.  She has not tried any nasal rinses but has used Flonase without success previously.    Past medical history: Foot surgery recently, wrist surgery    Social history: She is a nanny, lives in an apartment where she has lived for the past 13 years, has cats and a dog, secondhand smoke exposure, no central air    Family history: Mother, brother, father with allergies    Review of Systems performed as above and the remainder is negative.         Current Outpatient Medications:      baclofen (LIORESAL) 20 MG tablet, , Disp: , Rfl:      escitalopram oxalate (LEXAPRO) 20 MG tablet, , Disp: , Rfl:      methylphenidate HCl (METADATE CD) 10 MG CR capsule, , Disp: , Rfl:      multivitamin therapeutic tablet, Take 1 tablet by mouth daily., Disp: 30 tablet, Rfl: 0     HYDROcodone-acetaminophen 5-325 mg per tablet, Take 1 tablet by mouth every 4 (four) hours as needed., Disp: 30 tablet, Rfl: 0     ibuprofen (ADVIL,MOTRIN) 600 MG tablet, Take 1 tablet (600 mg total) by mouth 3 (three) times a day for 14 days. Take with food, Disp: 42 tablet, Rfl: 0     QUEtiapine (SEROQUEL) 25 MG tablet, Take 0.5 tablets (12.5 mg total) by mouth 3 (three) times a day as needed (anxiety)., Disp: 30 tablet, Rfl: 0     traZODone (DESYREL) 50 MG tablet, Take 1 tablet (50 mg total) by mouth at bedtime., Disp: 30 tablet, Rfl: 0    No Known Allergies    Pulse 66   Ht 5' 7.75\" (1.721 m)   Wt 133 lb (60.3 kg)   " LMP 11/20/2020   SpO2 100%   BMI 20.37 kg/m    Gen: Pleasant female not in acute distress  HEENT: Eyes no erythema of the bulbar or palpebral conjunctiva, no edema. Ears: No deformity or lesions nose: No congestion, mucosa normal. Mouth: Throat clear, no lip or tongue edema.   Cardiac: Regular rate and rhythm, no murmurs, rubs or gallops  Respiratory: Clear to auscultation bilaterally, no adventitious breath sounds  Lymph: No visible supraclavicular or cervical lymphadenopathy  Skin: No rashes or lesions  Psych: Alert and oriented times 3    30 percutaneous test replaced, negative histamine control    Impression current plan:  1.  Allergic rhinitis  2.  Rhinitis    Negative histamine control today.  Check specific IgE to the respiratory disease panel.  Suggested montelukast or Astelin nasal spray.  I think Astelin would be more reasonable for her.  I will contact her tomorrow with further recommendations.  She asked about allergy shots.  I went over the risks and benefits of allergy shots.  I stated risks include hives, swelling, shortness of breath.  I did state that one in 2.5 million shot administrations can result in death.  I stated they must wait in the office for 30 minutes following the shot and carry an epinephrine device on the day of the shot.  I stated that shots are effective in about 90% of patients.  I stated that they should check with the insurance company prior to proceeding.  They understand the risks and benefits and will let me know if she wants to proceed pending her allergy test results.  Suggested sinus rinses and Vaseline to the nose.

## 2021-06-13 NOTE — PROGRESS NOTES
Subjective findings: The patient return to the clinic today for postop visit #2, 2 weeks status post Lapidus bunionectomy of the right foot.  The patient is in good spirits and she had no complaints.      Objective findings: The dressings were removed and wound margins are well coaptated and maintained.  There is no edema no erythema no cellulitis no drainage or bleeding noted.  Neurovascular status is intact.  Vital signs stable.     Assessment: Hallux abductovalgus right foot     Plan: Applied a sterile dressing to the right foot.  The patient was instructed to remove the bandage in 2 weeks.  She is to remain total nonweightbearing for the next 4 weeks.  The patient is to return to the clinic in 1 month for postop visit #3.

## 2021-06-13 NOTE — ANESTHESIA PREPROCEDURE EVALUATION
Anesthesia Evaluation      Patient summary reviewed   No history of anesthetic complications     Airway   Mallampati: II  Neck ROM: full   Pulmonary - negative ROS and normal exam                          Cardiovascular - negative ROS and normal exam   Neuro/Psych - negative ROS   (+) anxiety/panic attacks,     Comments: H/o EtOH    Endo/Other - negative ROS      GI/Hepatic/Renal - negative ROS           Dental - normal exam                        Anesthesia Plan  Planned anesthetic: MAC  Versed/fentanyl/propofol  Ketamine PRN  Decadron/zofran    ASA 2   Induction: intravenous   Anesthetic plan and risks discussed with: patient  Anesthesia plan special considerations: antiemetics,   Post-op plan: routine recovery        11/30/2020 covid pcr negative    Results for orders placed or performed during the hospital encounter of 12/04/20   POCT Pregnancy (Beta-hCG, Qual), Urine (Point of Care) on DOS   Result Value Ref Range    POC Preg, Urine Negative Negative    POCT Kit Lot Number [id number]     POCT Kit Expiration Date 202,202     Pos Control Valid Control Valid Control    Neg Control Valid Control Valid Control    Dipstick Lot Number      Dipstick Expiration Date      POC Specific Gravity, Urine

## 2021-06-13 NOTE — ANESTHESIA CARE TRANSFER NOTE
Last vitals:   Vitals:    12/04/20 1112   BP: 96/54   Pulse: 71   Resp: 16   Temp: 36.9  C (98.5  F)   SpO2: 99%     Pt brought to phase 2 on 6L O2. Monitors applied. VSS.    Patient's level of consciousness is drowsy  Spontaneous respirations: yes  Maintains airway independently: yes  Dentition unchanged: yes  Oropharynx: oropharynx clear of all foreign objects    QCDR Measures:  ASA# 20 - Surgical Safety Checklist: WHO surgical safety checklist completed prior to induction    PQRS# 430 - Adult PONV Prevention: 4558F - Pt received => 2 anti-emetic agents (different classes) preop & intraop  ASA# 8 - Peds PONV Prevention: NA - Not pediatric patient, not GA or 2 or more risk factors NOT present  PQRS# 424 - Cherelle-op Temp Management: 4559F - At least one body temp DOCUMENTED => 35.5C or 95.9F within required timeframe  PQRS# 426 - PACU Transfer Protocol: - Transfer of care checklist used  ASA# 14 - Acute Post-op Pain: ASA14B - Patient did NOT experience pain >= 7 out of 10

## 2021-06-13 NOTE — TELEPHONE ENCOUNTER
Pt calling requesting an increased dose of her pain medication. She is having pain at night and in the AM. She had BUNIONECTOMY, LAPIDUS 12/4. She is taking the hydrocodone and ibuprofen as directed. Please advise.

## 2021-06-13 NOTE — PATIENT INSTRUCTIONS - HE

## 2021-06-13 NOTE — PROGRESS NOTES
HISTORY OF PRESENT ILLNESS  Asked to see by Dr. Anaya for evaluation of deviated septum. Patient reports that she feels that her left nasal passage is bigger than the right. She believes that the growth of her nose has shifted over the years. She feels that the left side of her nose is more prominent. No trauma. No difficulty breathing, but she can tell that she can get more air through the left. No swelling or inflammation. No nasal drainage or discharge.    REVIEW OF SYSTEMS  Review of Systems: a 10-system review was performed. Pertinent positives are noted in the HPI and on a separate scanned document in the chart.    PMH, PSH, FH and SH has documented in the EHR.      EXAM    CONSTITUTIONAL  General Appearance:  Normal, well developed, well nourished, no obvious distress  Ability to Communicate:  communicates appropriately.    HEAD AND FACE  Appearance and Symmetry:  Normal, no scalp or facial scarring or suspicious lesions.    EYE  Normal external eye, conjunctiva, lids, cornea.     EARS  Clinical speech reception threshold:  Normal, able to hear normal speech.  Auricle:  Normal, Auricles without scars, lesions, masses.  External auditory canal:  Normal, External auditory canal normal.  Tympanic membrane:  Normal, Tympanic membranes normal without swelling or erythema.  Tympanic membrane mobility:  Normal, Normal tympanic membrane mobility.    NOSE (speculum or scope)  Architecture: Patient has an asymmetric slighlyt bulbous tip with the left side being more prominent than the right. The bony dorsum is straight.  Mucosa:  Normal mucosa, No polyps or masses.  Septum: Right septal deformity with narrowing of the right nasal airway.  Turbinates:  Normal, No turbinate abnormalities    ORAL CAVITY AND OROPHARYNX  Lips:  Normal.  Dental and gingiva:  Normal, No obvious dental or gingival disease.  Mucosa:  Normal, Moist mucous membranes.  Tongue:  Normal, Tongue mobile with no mucosal abnormalities  Hard and soft  palate:  Normal, Hard and soft palate without cleft or mucosal lesions.  Oral pharynx:  Normal, Posterior pharynx without lesions or remarkable asymmetry.  Saliva:  Normal, Clear saliva.  Masses:  Normal, No palpable masses or pathologically enlarged lymph nodes.    NECK  Masses/lymph nodes:  Normal, No worrisome neck masses or lymph nodes.  Salivary glands:  Normal, Parotid and submandibular glands.  Trachea and larynx position:  Normal, Trachea and larynx midline.  Thyroid:  Normal, No thyroid abnormality.  Tenderness:  Normal, No cervical tenderness.  Suppleness:  Normal, Neck supple    CARDIOVASCULAR  Regular rate and rhythm.  No cyanosis, clubbing or edema.    PULSES  Carotid pulses normal    NEUROLOGICAL  Speech pattern:  Normal, Proasaic    RESPIRATORY  Symmetry and Respiratory effort:  Normal, Symmetric chest movement and expansion with no increased intercostal retractions or use of accessory muscles.     IMPRESSION  Patient has an asymmetric nasal tip and right septal deformity.    RECOMMENDATION  She is interested in correction. I discussed referral to Dr. Thompson, [facility] for an opinion.     Andre Moss MD

## 2021-06-14 NOTE — TELEPHONE ENCOUNTER
Spoke with Tracy regarding orthotics order. She states she was told to call one of the locations listed on her discharge paperwork but they said they did not have an order. Placed order for orthotics and let her know this was completed.

## 2021-06-14 NOTE — TELEPHONE ENCOUNTER
Please enter order for orthotics to be sent to [facility]. Notify patient when complete so she can schedule.

## 2021-06-14 NOTE — PROGRESS NOTES
Subjective findings: The patient return to the clinic today for postop visit #3, 6 weeks status post Lapidus bunionectomy of the right foot.  The patient is in good spirits and she had no complaints.      Objective findings: The  wound is well-healed.  There is no edema no erythema no cellulitis no drainage or bleeding noted.  Neurovascular status is intact.  Vital signs stable.  Surgical correction has been maintained.     Assessment: Hallux abductovalgus right foot     Plan: An x-ray of the right foot was taken today.  I informed the patient the x-rays show complete consolidation of the arthrodesis site.  The patient was instructed to gradually return to normal activities.  She is to return to the clinic as needed.

## 2021-06-14 NOTE — PATIENT INSTRUCTIONS - HE
What are Prescription Custom Orthotics?  Custom orthotics are specially-made devices designed to support and comfort your feet. Prescription orthotics are crafted for you and no one else. They match the contours of your feet precisely and are designed for the way you move. Orthotics are only manufactured after a podiatrist has conducted a complete evaluation of your feet, ankles, and legs, so the orthotic can accommodate your unique foot structure and pathology.  Prescription orthotics are divided into two categories:    Functional orthotics are designed to control abnormal motion. They may be used to treat foot pain caused by abnormal motion; they can also be used to treat injuries such as shin splints or tendinitis. Functional orthotics are usually crafted of a semi-rigid material such as plastic or graphite.    Accommodative orthotics are softer and meant to provide additional cushioning and support. They can be used to treat diabetic foot ulcers, painful calluses on the bottom of the foot, and other uncomfortable conditions.  Podiatrists use orthotics to treat foot problems such as plantar fasciitis, bursitis, tendinitis, diabetic foot ulcers, and foot, ankle, and heel pain. Clinical research studies have shown that podiatrist-prescribed foot orthotics decrease foot pain and improve function.  Orthotics typically cost more than shoe inserts purchased in a retail store, but the additional cost is usually well worth it. Unlike shoe inserts, orthotics are molded to fit each individual foot, so you can be sure that your orthotics fit and do what they're supposed to do. Prescription orthotics are also made of top-notch materials and last many years when cared for properly. Insurance often helps pay for prescription orthotics.  What are Shoe Inserts?   You've seen them at the grocery store and at the mall. You've probably even seen them on TV and online. Shoe inserts are any kind of non-prescription foot support  designed to be worn inside a shoe. Pre-packaged, mass produced, arch supports are shoe inserts. So are the  custom-made  insoles and foot supports that you can order online or at retail stores. Unless the device has been prescribed by a doctor and crafted for your specific foot, it's a shoe insert, not a custom orthotic device--despite what the ads might say.  Shoe inserts can be very helpful for a variety of foot ailments, including flat arches and foot and leg pain. They can cushion your feet, provide comfort, and support your arches, but they can't correct biomechanical foot problems or cure long-standing foot issues.  The most common types of shoe inserts are:    Arch supports: Some people have high arches. Others have low arches or flat feet. Arch supports generally have a  bumped-up  appearance and are designed to support the foot's natural arch.     Insoles: Insoles slip into your shoe to provide extra cushioning and support. Insoles are often made of gel, foam, or plastic.     Heel liners: Heel liners, sometimes called heel pads or heel cups, provide extra cushioning in the heel region. They may be especially useful for patients who have foot pain caused by age-related thinning of the heels' natural fat pads.     Foot cushions: Do your shoes rub against your heel or your toes? Foot cushions come in many different shapes and sizes and can be used as a barrier between you and your shoe.  Choosing an Over-the-Counter Shoe Insert  Selecting a shoe insert from the wide variety of devices on the market can be overwhelming. Here are some podiatrist-tested tips to help you find the insert that best meets your needs:    Consider your health. Do you have diabetes? Problems with circulation? An over-the-counter insert may not be your best bet. Diabetes and poor circulation increase your risk of foot ulcers and infections, so schedule an appointment with a podiatrist. He or she can help you select a solution that won't  cause additional health problems.     Think about the purpose. Are you planning to run a marathon, or do you just need a little arch support in your work shoes? Look for a product that fits your planned level of activity.     Bring your shoes. For the insert to be effective, it has to fit into your shoes. So bring your sneakers, dress shoes, or work boots--whatever you plan to wear with your insert. Look for an insert that will fit the contours of your shoe.     Try them on. If all possible, slip the insert into your shoe and try it out. Walk around a little. How does it feel? Don't assume that feelings of pressure will go away with continued wear. (If you can't try the inserts at the store, ask about the store's return policy and hold on to your receipt.)    Please call one of the [facility] locations below to schedule an appointment. If you received a prescription please bring it with you to your appointment. Some locations are limited to what they carry.    Office Locations    [facility]  [address] [place] [address]  Home Medical Equipment, [address]   Phone: [phone]   Orthotics and Prosthetics, [address]   Phone: [phone]    [facility]  Home Medical Equipment  [address], [place] [address]   Phone: [phone]    Orthotics and Prosthetics ([facility])    [address], [address], [place] [address]  Phone: [phone]    [facility]  [address]   [place] [address]   Phone: [phone]    [facility]  [address]  [place] [address]  Phone: [phone]    [facility]   [address]  [place] [address]  Phone: [phone]    [facility]  [address]  [place] [address]  Phone: [phone]    [facility]  [address]  [place] [address]  Phone: [phone]    [address] [place] [address]   Phone: [phone]

## 2021-06-18 NOTE — PATIENT INSTRUCTIONS - HE
Patient Instructions by Nain Quintanilla PT at 5/20/2020  3:00 PM     Author: Nain Quintanilla PT Service: -- Author Type: Physical Therapist    Filed: 5/20/2020  3:57 PM Encounter Date: 5/20/2020 Status: Signed    : Nain Quintanilla PT (Physical Therapist)        QUAD STRETCH - STANDING    While in a standing position, bend your knee back behind and hold your ankle/foot.    Next, gently pull your knee into a more bent position.    Hold 30 seconds, 2x each leg.  2x/day.      ELASTIC BAND LATERAL WALKS     With an elastic band around both ankles, walk to the side while keeping your feet spread apart. Keep your knees bent the entire time.    10 steps left, 10 steps right.  2-3 sets to fatigue.    1-2x/day.      BRACE - SINGLE KNEE EXTENSION    Perform pelvic tilt (flatten back).  Keeping back flat, alternate kicking one leg out straight as shown.    Hold 2 seconds. 10x each leg.  2x/day.

## 2021-06-18 NOTE — PATIENT INSTRUCTIONS - HE
Patient Instructions by Nain Quintanilla PT at 5/27/2020  3:30 PM     Author: Nain Quintainlla PT Service: -- Author Type: Physical Therapist    Filed: 5/27/2020  4:07 PM Encounter Date: 5/27/2020 Status: Signed    : Nain Quintanilla PT (Physical Therapist)        BRIDGE - ALTERNATE KNEE EXTENSION - ALT KNEE EXT    While lying on your back, raise your buttocks off the floor/bed into a bridge position.      Next straighten a leg so that only one leg is supporting your body. Then, return that leg back to the ground and change to the other side.        Try and maintain your pelvis level the entire time.    Then lower everything down.    Aim for 10-15 reps each leg.  1-2x/day.

## 2021-06-18 NOTE — PATIENT INSTRUCTIONS - HE
Patient Instructions by Pamela Becerril PT at 4/29/2020 11:00 AM     Author: Pamela Becerril PT Service: -- Author Type: Physical Therapist    Filed: 4/29/2020 12:04 PM Encounter Date: 4/29/2020 Status: Signed    : Pamela Becerril PT (Physical Therapist)        GASTROCNEMIUS STRETCH    While standing and leaning against a wall, place one foot back behind you and bend the front knee until a gentle stretch is felt on the back of the lower leg.     Your back knee should be straight the entire time.    3 times for 30 seconds      SOLEUS STRETCH    While standing and leaning against a wall, place one foot back behind you and bend the front knee until a gentle stretch is felt on the back of the lower leg.     Your back knee should be bent the entire time.       3 times for 30 seconds.          10-15 reps to start, can increase reps to 20.         Perform to the side (middle picture)  Start with 10-15 reps with 2-4 inch step.

## 2021-06-18 NOTE — PATIENT INSTRUCTIONS - HE
Patient Instructions by Nain Quintanilla PT at 5/12/2020 11:30 AM     Author: Nain Quintanilla PT Service: -- Author Type: Physical Therapist    Filed: 5/12/2020 12:27 PM Encounter Date: 5/12/2020 Status: Signed    : Nain Quintanilla PT (Physical Therapist)        LEVATOR SCAPULAE STRETCH - MODIFIED    Grasp your arm of the affected side and pull it gently towards the opposite side in front of your body.  Next, tilt your head downward and to the side looking away from the affected side until a stretch is felt.    Hold 10 seconds.   2-3x/day.       Thread the Needle    Start: Quadriped position, neutral cervical, thoracic and lumbar spine.  Shoulders over wrists, hips over knees.  Movement: Press through weight-bearing palm, reach through with opposite arm, allowing thoracic spine to rotate, gaze follows moving hand.    Repeat on other side.  Hold 3-5 seconds. 3-5x each side. 2-3x/day.     Prone Scapular Retraction - I's    Lying face-down with your arms straight near your sides, bring your arms up by squeezing your shoulder blades together. Do not let your shoulders ride up to your ears, by engaging the muscles between your shoulder blades.    Hold 3 seconds. 10x.  1x/day.          THORACIC HORIZONTAL FOAM ROLLER    Place a foam roller under your upper back while sitting on the floor.  Arch your back over the foam roller.  Do not place foam roller across your lower back.    Extend back x2 seconds, then return to starting position. 5-10x.  2-3x/day.        THORACIC VERTICAL FOAM ROLLER    Place a foam roller under your spine vertically while on the floor.  Make sure your head is also supported by the foam roller.  Place arms out to side for a chest stretch.    Relax open x1-2 minutes.  2-3x/day.

## 2021-06-19 NOTE — ANESTHESIA PROCEDURE NOTES
Peripheral Block    Patient location during procedure: pre-op  Start time: 7/9/2018 9:32 AM  End time: 7/9/2018 9:36 AM  post-op analgesia per surgeon order as noted in medical record  Staffing:  Performing  Anesthesiologist: ARMINDA JOHNS  Preanesthetic Checklist  Completed: patient identified, site marked, risks, benefits, and alternatives discussed, timeout performed, consent obtained, airway assessed, oxygen available, suction available, emergency drugs available and hand hygiene performed  Peripheral Block  Block type: brachial plexus, axillary  Prep: ChloraPrep  Patient position: supine  Patient monitoring: cardiac monitor, continuous pulse oximetry, heart rate and blood pressure  Laterality: right  Injection technique: ultrasound guided    Ultrasound used to visualize needle placement in proximity to nerve being blocked: yes   Permanent ultrasound image captured for medical record  Sterile gel and probe cover used for ultrasound.    Needle  Needle type: echogenic   Needle gauge: 20G  Needle length: 4 in    Assessment  Injection assessment: no difficulty with injection, negative aspiration for heme, no paresthesia on injection and incremental injection  Additional Notes    Right axillary block with bupivacaine 0.5% 20 cc, with intercostobrachial block 5 cc bupiv.    Arminda Johns MD  Staff Anesthesiologist  [facility]

## 2021-06-19 NOTE — ANESTHESIA CARE TRANSFER NOTE
Last vitals:   Vitals:    07/09/18 1137   BP: 128/58   Pulse: (!) 102   Resp: 16   Temp:    SpO2: 96%     Patient's level of consciousness is drowsy  Spontaneous respirations: yes  Maintains airway independently: yes  Dentition unchanged: yes  Oropharynx: oropharynx clear of all foreign objects    QCDR Measures:  ASA# 20 - Surgical Safety Checklist: WHO surgical safety checklist completed prior to induction  PQRS# 430 - Adult PONV Prevention: 4558F - Pt received => 2 anti-emetic agents (different classes) preop & intraop  ASA# 8 - Peds PONV Prevention: NA - Not pediatric patient, not GA or 2 or more risk factors NOT present  PQRS# 424 - Cherelle-op Temp Management: 4559F - At least one body temp DOCUMENTED => 35.5C or 95.9F within required timeframe  PQRS# 426 - PACU Transfer Protocol: - Transfer of care checklist used  ASA# 14 - Acute Post-op Pain: ASA14B - Patient did NOT experience pain >= 7 out of 10

## 2021-06-19 NOTE — LETTER
Letter by Funmi Anaya MD at      Author: Funmi Anaya MD Service: -- Author Type: --    Filed:  Encounter Date: 12/2/2019 Status: Signed         [address]  [place] [address]             December 2, 2019         Dear Ms. Davis,    Below are the results from your recent visit:    Resulted Orders   HM2(CBC w/o Differential)   Result Value Ref Range    WBC 8.7 4.0 - 11.0 thou/uL    RBC 3.58 (L) 3.80 - 5.40 mill/uL    Hemoglobin 11.8 (L) 12.0 - 16.0 g/dL    Hematocrit 35.6 35.0 - 47.0 %    MCV 99 80 - 100 fL    MCH 33.1 27.0 - 34.0 pg    MCHC 33.3 32.0 - 36.0 g/dL    RDW 11.0 11.0 - 14.5 %    Platelets 431 140 - 440 thou/uL    MPV 6.9 (L) 7.0 - 10.0 fL   Basic Metabolic Panel   Result Value Ref Range    Sodium 137 136 - 145 mmol/L    Potassium 4.3 3.5 - 5.0 mmol/L    Chloride 102 98 - 107 mmol/L    CO2 26 22 - 31 mmol/L    Anion Gap, Calculation 9 5 - 18 mmol/L    Glucose 79 70 - 125 mg/dL    Calcium 9.7 8.5 - 10.5 mg/dL    BUN 4 (L) 8 - 22 mg/dL    Creatinine 0.65 0.60 - 1.10 mg/dL    GFR MDRD Af Amer >60 >60 mL/min/1.73m2    GFR MDRD Non Af Amer >60 >60 mL/min/1.73m2    Narrative    Fasting Glucose reference range is 70-99 mg/dL per  American Diabetes Association (ADA) guidelines.   Thyroid Cascade   Result Value Ref Range    TSH 1.87 0.30 - 5.00 uIU/mL   Vitamin B12   Result Value Ref Range    Vitamin B-12 529 213 - 816 pg/mL   Hepatic Profile   Result Value Ref Range    Bilirubin, Total 0.5 0.0 - 1.0 mg/dL    Bilirubin, Direct 0.2 <=0.5 mg/dL    Protein, Total 6.8 6.0 - 8.0 g/dL    Albumin 3.8 3.5 - 5.0 g/dL    Alkaline Phosphatase 54 45 - 120 U/L    AST 24 0 - 40 U/L    ALT 34 0 - 45 U/L       Duane Molina.  Your labs found that your liver tests have normalized and your thyroid, kidney, glucose, and B12 level are normal.  Your hemoglobin is slightly decreased but not at a concerning level.  Please let me know if you have any questions.         Sincerely,        Electronically signed by Funmi Anaya MD

## 2021-06-19 NOTE — ANESTHESIA PREPROCEDURE EVALUATION
Anesthesia Evaluation      Patient summary reviewed     Airway   Mallampati: II  Neck ROM: full   Pulmonary    (+) a smoker                         Cardiovascular - negative ROS  Exercise tolerance: > or = 4 METS  Rhythm: regular        Neuro/Psych    (+) depression, anxiety/panic attacks,   Chronic pain: NPO 10 PM.    Endo/Other - negative ROS      GI/Hepatic/Renal - negative ROS      Other findings:     NPO 10 PM     Results for MENDOZA CARLIN NINFA (MRN [id number]) as of 7/9/2018 7/9/2018 08:48  Pregnancy Test, Urine: Negative          Dental                         Anesthesia Plan  Planned anesthetic: peripheral nerve block      Scopolamine patch        ASA 2     Anesthetic plan and risks discussed with: patient  Anesthesia plan special considerations: antiemetics,   Post-op plan: routine recovery        Arminda Johns MD  Staff Anesthesiologist  [facility]  7/9/18

## 2021-06-19 NOTE — ANESTHESIA POSTPROCEDURE EVALUATION
Patient: Tracy Davis  RIGHT DISTAL RADIUS FRACTURE OPEN REDUCTION INTERNAL FIXATION  Anesthesia type: regional    Patient location: PACU  Last vitals:   Vitals:    07/09/18 1200   BP:    Pulse:    Resp: 19   Temp:    SpO2:      Post vital signs: stable  Level of consciousness: awake and responds to simple questions  Post-anesthesia pain: pain controlled  Post-anesthesia nausea and vomiting: no  Pulmonary: unassisted, return to baseline  Cardiovascular: stable and blood pressure at baseline  Hydration: adequate  Anesthetic events: no    QCDR Measures:  ASA# 11 - Cherelle-op Cardiac Arrest: ASA11B - Patient did NOT experience unanticipated cardiac arrest  ASA# 12 - Cherelle-op Mortality Rate: ASA12B - Patient did NOT die  ASA# 13 - PACU Re-Intubation Rate: ASA13B - Patient did NOT require a new airway mgmt  ASA# 10 - Composite Anes Safety: ASA10A - No serious adverse event    Additional Notes:      Vitals:    07/09/18 0951 07/09/18 1137 07/09/18 1145 07/09/18 1200   BP:  128/58 121/69    Patient Position:       Pulse: (!) 105 60 (!) 101    Resp: 16 16 27 19   Temp:       TempSrc:       SpO2:  96% 98%    Weight:       Height:

## 2021-06-19 NOTE — PROGRESS NOTES
"ASSESSMENT/PLAN:       1. Right wrist pain with radial fracture distally    - XR Wrist Right 3 or More VWS with my review shows a fracture of the distal radius with some mild displacement and comminution of the fracture and it appears that it may go into the joint line.  The patient will go to [facility].  I understand that they are able to see x-ray results and images from [facility].  The patient will use her soft wrist brace until she gets seen.  No scheduled follow-up with me at this point.          Shane Powell MD      PROGRESS NOTE   6/28/2018    SUBJECTIVE:  Tracy Davis is a 38 y.o. female  who presents for   Chief Complaint   Patient presents with     Wrist Injury     Right wrist injury, hurt it riding her horse on 6/25/18     #1 right wrist pain  3 days ago the patient was riding her horse and the horse reared up and blocked and she jammed her right hand and wrist.  She did not fall off the horse and did not sustain any other injuries besides to her wrist.  She is right-hand dominant and fairly quickly she noticed some swelling of the wrist.  She did try to work on Tuesday and it was difficult.  She has applied some ice and taken some ibuprofen.  She has not had any past history of fractures.    Patient Active Problem List   Diagnosis     Anxiety       No current outpatient prescriptions on file.     No current facility-administered medications for this visit.        History   Smoking Status     Current Some Day Smoker     Types: Cigarettes   Smokeless Tobacco     Never Used     Comment: one or two cigs a day            OBJECTIVE:        No results found for this or any previous visit (from the past 240 hour(s)).    Vitals:    06/28/18 1419   BP: 130/78   Patient Position: Sitting   Cuff Size: Adult Regular   Pulse: (!) 45   Resp: 14   SpO2: 98%   Weight: 124 lb 5 oz (56.4 kg)   Height: 5' 7.5\" (1.715 m)     Weight: 124 lb 5 oz (56.4 kg)        Physical Exam:  GENERAL " APPEARANCE: Pleasant 38-year-old female, complaining of right wrist pain, well hydrated, well nourished  SKIN: Marked bruising on the volar surface of the right forearm from the wrist to the elbow  EXTREMITY:  swelling of the right wrist with decreased range of motion of the wrist and tenderness to palpation both at the distal radius and ulna.  Patient is able to fully move her fingers and does not have snuffbox tenderness.  She is able to fully extend and flex her elbow.  NEURO: no focal findings

## 2021-06-20 NOTE — LETTER
Letter by Funmi Anaya MD at      Author: Funmi Anaya MD Service: -- Author Type: --    Filed:  Encounter Date: 12/17/2019 Status: Signed        [facility] INTERNAL MEDIC[facility]  [address]  [facility] [address]  [phone]         Tracy Davis  [address]  [place] [address]        12/17/19    Dear Tracy    At [facility] we care about your health and well-being. Your primary care provider is committed to ensuring you receive high quality care and has chosen a network of specialists to assist in providing that care. Recently Dr. Anaya referred you to [facility] for specialty care.     Please call [phone] at your earliest convenience for assistance in scheduling an appointment. If you have already scheduled this appointment, please disregard this notice. Thank you for choosing [facility] for your healthcare needs.       Sincerely,       [facility] Specialty Scheduling

## 2021-06-21 NOTE — LETTER
Letter by Summer Burnette MD at      Author: Summer Burnette MD Service: -- Author Type: --    Filed:  Encounter Date: 12/17/2020 Status: (Other)         December 17, 2020     Funmi Anaya MD  [address] [facility] [address]  [facility] [address]    Patient: Tracy Davis   MR Number: [id number]   YOB: 1980   Date of Visit: 12/17/2020     Dear Dr. Héctor MD:    Thank you for referring Tracy Davis to me for evaluation.  Unfortunately, allergy testing was inconclusive.  I have asked her to undergo specific IgE testing.  I have included my note for your review.    If you have questions, please do not hesitate to call me.     Sincerely,        Summer Burnette MD        CC  No Recipients    Progress Notes:Chief complaint: Allergies    History of present illness: This is a pleasant 40-year-old woman who I was asked to see by Dr. Anaya in regards to allergies.  Patient states that she has had seasonal allergies for some time now but lately she has noted that she will have symptoms at this time a year.  She states she wakes up in the morning with a stuffy nose.  Benadryl does seem to help.  Symptoms seem to worsen at night as well.  She does note drainage and sneezing as well.  No history of asthma.  Denies any cough, wheeze or shortness of breath.  She has not tried any nasal rinses but has used Flonase without success previously.    Past medical history: Foot surgery recently, wrist surgery    Social history: She is a nanny, lives in an apartment where she has lived for the past 13 years, has cats and a dog, secondhand smoke exposure, no central air    Family history: Mother, brother, father with allergies    Review of Systems performed as above and the remainder is negative.         Current Outpatient Medications:   ?  baclofen (LIORESAL) 20 MG tablet, , Disp: , Rfl:   ?  escitalopram oxalate (LEXAPRO) 20 MG tablet, , Disp: , Rfl:   ?  methylphenidate HCl (METADATE CD) 10 MG CR capsule,  ", Disp: , Rfl:   ?  multivitamin therapeutic tablet, Take 1 tablet by mouth daily., Disp: 30 tablet, Rfl: 0  ?  HYDROcodone-acetaminophen 5-325 mg per tablet, Take 1 tablet by mouth every 4 (four) hours as needed., Disp: 30 tablet, Rfl: 0  ?  ibuprofen (ADVIL,MOTRIN) 600 MG tablet, Take 1 tablet (600 mg total) by mouth 3 (three) times a day for 14 days. Take with food, Disp: 42 tablet, Rfl: 0  ?  QUEtiapine (SEROQUEL) 25 MG tablet, Take 0.5 tablets (12.5 mg total) by mouth 3 (three) times a day as needed (anxiety)., Disp: 30 tablet, Rfl: 0  ?  traZODone (DESYREL) 50 MG tablet, Take 1 tablet (50 mg total) by mouth at bedtime., Disp: 30 tablet, Rfl: 0    No Known Allergies    Pulse 66   Ht 5' 7.75\" (1.721 m)   Wt 133 lb (60.3 kg)   LMP 11/20/2020   SpO2 100%   BMI 20.37 kg/m    Gen: Pleasant female not in acute distress  HEENT: Eyes no erythema of the bulbar or palpebral conjunctiva, no edema. Ears: No deformity or lesions nose: No congestion, mucosa normal. Mouth: Throat clear, no lip or tongue edema.   Cardiac: Regular rate and rhythm, no murmurs, rubs or gallops  Respiratory: Clear to auscultation bilaterally, no adventitious breath sounds  Lymph: No visible supraclavicular or cervical lymphadenopathy  Skin: No rashes or lesions  Psych: Alert and oriented times 3    30 percutaneous test replaced, negative histamine control    Impression current plan:  1.  Allergic rhinitis  2.  Rhinitis    Negative histamine control today.  Check specific IgE to the respiratory disease panel.  Suggested montelukast or Astelin nasal spray.  I think Astelin would be more reasonable for her.  I will contact her tomorrow with further recommendations.  She asked about allergy shots.  I went over the risks and benefits of allergy shots.  I stated risks include hives, swelling, shortness of breath.  I did state that one in 2.5 million shot administrations can result in death.  I stated they must wait in the office for 30 minutes " following the shot and carry an epinephrine device on the day of the shot.  I stated that shots are effective in about 90% of patients.  I stated that they should check with the insurance company prior to proceeding.  They understand the risks and benefits and will let me know if she wants to proceed pending her allergy test results.  Suggested sinus rinses and Vaseline to the nose.

## 2021-06-29 NOTE — PROGRESS NOTES
Progress Notes by Nain Quintanilla PT at 5/12/2020 11:30 AM     Author: Nain Quintanilla PT Service: -- Author Type: Physical Therapist    Filed: 5/12/2020  1:17 PM Encounter Date: 5/12/2020 Status: Attested    : Nain Quintanilla PT (Physical Therapist) Cosigner: Dominik Rios DO at 5/13/2020 10:08 AM    Attestation signed by Dominik Rios DO at 5/13/2020 10:08 AM    Agree with treatment plan                    [facility] Certification Request    May 12, 2020      Patient: Tracy Davis  MR Number: [id number]  YOB: 1980  Date of Visit: 5/12/2020      Dear Dr. Neymar Duran:    Thank you for this referral.   We are seeing Tracy Davis in Physical Therapy for left periscapular pain.    Medicare and/or Medicaid requires physician review and approval of the treatment plan. Please review the plan of care and verify that you agree with the therapy plan of care by co-signing this note.      Plan of Care  Authorization / Certification Start Date: 05/12/20  Authorization / Certification End Date: 08/10/20  Authorization / Certification Number of Visits: 6  Communication with: Referral Source  Patient Related Instruction: Nature of Condition;Treatment plan and rationale;Self Care instruction;Basis of treatment;Body mechanics;Posture;Precautions;Next steps;Expected outcome  Times per Week: 1  Number of Weeks: 6  Number of Visits: 6  Discharge Planning: HEP, self management  Precautions / Restrictions : none  Therapeutic Exercise: Stretching;Strengthening;ROM  Neuromuscular Reeducation: kinesio tape  Manual Therapy: soft tissue mobilization;joint mobilization;myofascial release  Manual Therapy: as needed if seen in clinic for follow up      Goals:  Pt. will demonstrate/verbalize independence in self-management of condition in : 4 weeks  Pt. will be independent with home exercise program in : 6 weeks;Comment  Comment:: for resolution of pain  Pt. will  decrease use of medication for pain for improved quality of life in : 6 weeks  Patient will sit: 60 minutes;with no pain;in 4 weeks;Comment  Comment: for eating and doing computer work  Patient will transfer: sit/stand;for in/out of bed;with no pain;in 4 weeks    Pt will: be able to lift, reach, carry for household chores and ADLs without pain in 6 weeks.        If you have any questions or concerns, please don't hesitate to call.    Sincerely,      Nain Quintanilla, PT        Physician recommendation:     ___ Follow therapist's recommendation        ___ Modify therapy      *Physician co-signature indicates they certify the need for these services furnished within this plan and while under their care.    [facility]   Initial Evaluation    Patient Name: Tracy Davis  Date of evaluation: 5/12/2020  PRECAUTIONS: none  Referral Diagnosis:   M89.8X1 (ICD-10-CM) - Shoulder blade pain    M54.2 (ICD-10-CM) - Cervical spine pain      (6 visits max)  Referring provider: Neymar Duran,   Visit Diagnosis:     ICD-10-CM    1. Pain of left scapula  M89.8X1    2. Acute neck pain  M54.2        Assessment:      Pt. is appropriate for skilled PT intervention as outlined in the Plan of Care (POC).  Pt. is a good candidate for skilled PT services to improve pain levels and function.  Goals and POC established in collaboration with the patient.    Pt presents to PT with L periscapular (superior and medial) pain, most consistent with levator scapula muscle spasm/reduced thoracic mobility. Differential diagnosis for cervical radiculopathy, and will continue to monitor and adjust treatment as necessary.  HEP initiated today, and patient with good tolerance for all ex. Although examination was unable to recreate her sharp, shooting pain; she does report good stretch felt to same area of pain with her HEP.    Goals:  Pt. will demonstrate/verbalize independence in self-management of condition in : 4 weeks  Pt.  will be independent with home exercise program in : 6 weeks;Comment  Comment:: for resolution of pain  Pt. will decrease use of medication for pain for improved quality of life in : 6 weeks  Patient will sit: 60 minutes;with no pain;in 4 weeks;Comment  Comment: for eating and doing computer work  Patient will transfer: sit/stand;for in/out of bed;with no pain;in 4 weeks    Pt will: be able to lift, reach, carry for household chores and ADLs without pain in 6 weeks.      Patient's expectations/goals are realistic.    Barriers to Learning or Achieving Goals:   Depression, anxiety       Plan / Patient Instructions:        Plan of Care:   Authorization / Certification Start Date: 05/12/20  Authorization / Certification End Date: 08/10/20  Authorization / Certification Number of Visits: 6  Communication with: Referral Source  Patient Related Instruction: Nature of Condition;Treatment plan and rationale;Self Care instruction;Basis of treatment;Body mechanics;Posture;Precautions;Next steps;Expected outcome  Times per Week: 1  Number of Weeks: 6  Number of Visits: 6  Discharge Planning: HEP, self management  Precautions / Restrictions : none  Therapeutic Exercise: Stretching;Strengthening;ROM  Neuromuscular Reeducation: kinesio tape  Manual Therapy: soft tissue mobilization;joint mobilization;myofascial release  Manual Therapy: as needed if seen in clinic for follow up      Plan for next visit: Review HEP, continue MT, and consider use of Ktape to L levator scap. Plan to continue with with treatment plan as started via video visit for bilat knee pain.      Subjective:       History of Present Illness:    Tracy is a 39 y.o. female who presents to therapy today with complaints of acute left-sided periscapular (superior-medial scapular) pain.   Described as sharp, stabbing pain.  Denies radiation into the arm.  Onset: About 2-3 months ago.  Duration: Constant ache, intermittent sharp pain  Worse with at night/end of the  "day, lifting, reaching, carrying,   Better with icing 3x/day, topical Tiger balm  Pain Medication: Recently prescribed muscle relaxers and naproxen, which have been mildly helpful short-term.  Sleep: Denies waking due to pain.    Denies previous neck/back/shoulder surgeries.  No pacemaker or defibrillator.    [facility]: \"History of Present Illness: The patient presents for an evaluation of left lower cervical spine and parascapular pain.  This started 2 months ago without any injury.  She points to the left upper trapezius and along the medial aspect of the scapula and into the lower cervical spine and the left.  She reports over the past 2 months it is remained fairly consistent although waxes and wanes in intensity.  She has no radiation down her arms paresthesias or weakness.  Her pain is best in the morning worse at the end of the day and with certain activities such as working on her computer or holding her phone in a certain position.  It is a sharp stabbing pain at the end of the day.  Better with changing position.  She uses ice 3 times a day does yoga and also pushes on the parascapular region with a tennis ball.  She rates her pain at 10/10 at worst 7/10 today 3/10 at best.  Has taken ibuprofen with no benefit and also seen a chiropractor.\"    Pt seeks PT to \"get rid of back pain.\"    Pain Ratin  Pain rating at best: 3  Pain rating at worst: 9  Pain description: aching, pain, sharp, shooting and stabbing     Objective:      Patient Outcome Measures :    Neck Disability Score in %: 33     Scores range from 0-100%, where a score of 0% represents minimal pain and maximal function. The minmal clinically important difference is a score reduction of 10%.    Examination  1. Pain of left scapula     2. Acute neck pain       Involved side: Left  Posture Observation:      General sitting posture is  normal.  General standing posture is normal.    Cervical AROM WNL and non-provocative " t/o.  Negative Spurling's bilat.  Bilat shoulder AROM WFL and NP t/o.  MMT t/o cervical myotomes 5/5.  TTP with increased muscle spasm present L levator scap, also L upper trap but to lesser extent.  No pain with central PAs to cervical and thoracic spine, although hypomobility noted upper thoracic spine.    Treatment Today     TREATMENT MINUTES COMMENTS   Evaluation 15 Discussed therapy diagnosis, prognosis, POC, relevant anatomy and basis for tx.   Self-care/ Home management     Manual therapy 15 -Prone STM to L levator scap  -Supine sustained stretches to L upper trap and levator scap  -Seated active release to L upper trap (cervical flexion > R ROT with sustained pressure at levator scap scapular origin) 2 sets of 5   Neuromuscular Re-education     Therapeutic Activity     Therapeutic Exercises 30 Exercises:  Exercise #1: Levator scap stretch - h  Comment #1: 3x10 sec on L (optional)  Exercise #2: Thread the needle - H  Comment #2: 3x3-5 sec bilat  Exercise #3: Prone Is - H  Comment #3: 10x3 sec  Exercise #4: Foam roller mobility ex (patient will purchase) - H  Comment #4: T lying x2 min, segmental extension x10, alternating UE flexion x10, scap pro- and retraction x10     Gait training     Modality__________________                Total 60    Blank areas are intentional and mean the treatment did not include these items.            PT Evaluation Code: (Please list factors)  Patient History/Comorbidities: depression, anxiety  Examination: L scapular pain, difficulty with reaching/lifting/carrying, thoracic stiffness, muscle spasm  Clinical Presentation: Evolving  Clinical Decision Making: Moderate    Patient History/  Comorbidities Examination  (body structures and functions, activity limitations, and/or participation restrictions) Clinical Presentation Clinical Decision Making (Complexity)   No documented Comorbidities or personal factors 1-2 Elements Stable and/or uncomplicated Low   1-2 documented  comorbidities or personal factor 3 Elements Evolving clinical presentation with changing characteristics Moderate   3-4 documented comorbidities or personal factors 4 or more Unstable and unpredictable High           Nain Quintanilla  5/12/2020  8:50 AM

## 2021-06-29 NOTE — PROGRESS NOTES
Progress Notes by Pamela Becerril PT at 4/29/2020 11:00 AM     Author: Pamela Becerril PT Service: -- Author Type: Physical Therapist    Filed: 4/30/2020  9:43 AM Encounter Date: 4/29/2020 Status: Attested Addendum    : Pamela Becerril PT (Physical Therapist)    Related Notes: Original Note by Pamela Becerril PT (Physical Therapist) filed at 4/29/2020 12:19 PM    Cosigner: Funmi Anaya MD at 4/30/2020 11:06 AM    Attestation signed by Funmi Anaya MD at 4/30/2020 11:06 AM    As above                Tracy Davis is a 39 y.o. female who is being seen via a billable video visit.  Patient has given verbal consent for video visit? Yes  Video Start Time: 11:03  Telehealth Visit Details:  Type of service: Telehealth  Video End Time (time video stopped): 11:37  Originating Location (Patient): Home   Additional Participants in Telehealth Visit: none  Distant Location (Provider Location): [facility]  Mode of Communication: Audio Visual    Pamela Becerril PT  4/29/2020        [facility] Certification Request    April 29, 2020      Patient: Tracy Davis  MR Number: [id number]  YOB: 1980  Date of Visit: 4/29/2020      Dear Funmi Wright MD:    Thank you for this referral.   We are seeing Tracy Davis for Physical Therapy of Chronic pain of both knees.    Medicare and/or Medicaid requires physician review and approval of the treatment plan. Please review the plan of care and verify that you agree with the therapy plan of care by co-signing this note.      Plan of Care  Authorization / Certification Start Date: 04/29/20  Authorization / Certification End Date: 06/30/20  Authorization / Certification Number of Visits: Blue plus adv MA/cert required  Communication with: Referral Source  Patient Related Instruction: Treatment plan and rationale;Nature of Condition;Self Care instruction;Basis of treatment;Next steps;Expected outcome  Times per  Week: 1  Number of Weeks: 4-6  Number of Visits: up to 6  Discharge Planning: HEP, self management  Precautions / Restrictions : none  Therapeutic Exercise: ROM;Stretching;Strengthening  Neuromuscular Reeducation: balance/proprioception  Manual Therapy: myofascial release;other  Manual Therapy: as needed if seen in clinic for follow up      Goals:  Pt. will demonstrate/verbalize independence in self-management of condition in : 4 weeks  Pt. will be independent with home exercise program in : 4 weeks  Patient will sit: 60 minutes;with no pain;in 4 weeks;Comment  Comment: for eating and doing computer work  Patient will transfer: sit/stand;for in/out of bed;with no pain;in 4 weeks    Pt will: be able to squat without pain in 4-6 weeks.        If you have any questions or concerns, please don't hesitate to call.    Sincerely,      Pamela Becerril, PT        Physician recommendation:     ___ Follow therapist's recommendation        ___ Modify therapy      *Physician co-signature indicates they certify the need for these services furnished within this plan and while under their care.      [facility]   Initial Evaluation    Patient Name: Tracy Davis  Date of evaluation: 4/29/2020   Visit #1/4-6  Referral Diagnosis: Chronic pain of both knees  Referring provider: Funmi Anaya MD  Visit Diagnosis:     ICD-10-CM    1. Pain in both knees, unspecified chronicity  M25.561     M25.562        Assessment:       Tracy Davis is a 39 y.o. female who presents to therapy today with chief complaints of (B) ant knee pain. Onset date of sx was 2 months ago.  Functional impairments include squatting, lunging, prolonged sitting, getting out of low bed.      Patient's signs and symptoms are consistent with patellofemoral syndrome.  Prognosis to achieve goals is  good   Pt. is appropriate for skilled PT intervention as outlined in the Plan of Care (POC).    Goals:  Pt. will demonstrate/verbalize independence in  self-management of condition in : 4 weeks  Pt. will be independent with home exercise program in : 4 weeks  Patient will sit: 60 minutes;with no pain;in 4 weeks;Comment  Comment: for eating and doing computer work  Patient will transfer: sit/stand;for in/out of bed;with no pain;in 4 weeks    Pt will: be able to squat without pain in 4-6 weeks.      Patient's expectations/goals are realistic.    Barriers to Learning or Achieving Goals:  No Barriers.       Plan / Patient Instructions:        Plan of Care:   Authorization / Certification Start Date: 04/29/20  Authorization / Certification End Date: 06/30/20  Authorization / Certification Number of Visits: Blue plus adv MA/cert required  Communication with: Referral Source  Patient Related Instruction: Treatment plan and rationale;Nature of Condition;Self Care instruction;Basis of treatment;Next steps;Expected outcome  Times per Week: 1  Number of Weeks: 4-6  Number of Visits: up to 6  Discharge Planning: HEP, self management  Precautions / Restrictions : none  Therapeutic Exercise: ROM;Stretching;Strengthening  Neuromuscular Reeducation: balance/proprioception  Manual Therapy: myofascial release;other  Manual Therapy: as needed if seen in clinic for follow up      Plan for next visit: progression of home exercise. Assessment of joint mobility, special tests and palpation if seen in clinic.      Subjective:         Social information:   Living Situation:   Occupation:not working   Work Status:NA   Equipment Available: None    History of Present Illness:    Tracy is a 39 y.o. female who presents to therapy today with complaints of (B) ant knee pain. Date of onset/duration of symptoms is 2 months, 2/2020. Onset was gradual and and insidious. Symptoms are intermittent and not improving. She denies history of similar symptoms. She describes their previous level of function as not limited  Patient reports (B) patellar pain with squatting, lunges, getting out of bed and  "after prolonged sitting. Denies swelling. Reports mild crepitus.  Tolerates daily yoga and walks 30 min/day without symptoms. No pain with stairs.   No previous treatment.     Pain Ratin  Pain rating at best: 0  Pain rating at worst: 7  Pain description: pain    Functional limitations are described as occurring with:   transitional movements getting out of  bed  squatting, lunges    Patient reports benefit from:  with movement    Past Medical History:   Diagnosis Date   ? Anxiety    ? Depression    ? Fracture of right distal radius      Past Surgical History:   Procedure Laterality Date   ? FOREARM FRACTURE SURGERY Right 2018    Procedure: RIGHT DISTAL RADIUS FRACTURE OPEN REDUCTION INTERNAL FIXATION;  Surgeon: Rudy Lovell MD;  Location: [facility];  Service:    ? WISDOM TOOTH EXTRACTION       Patient Active Problem List   Diagnosis   ? JUANPABLO (generalized anxiety disorder)   ? Suicidal ideation   ? Depression with anxiety   ? Sleep difficulties   ? Alcoholism /alcohol abuse (H)          Objective:      Patient Outcome Measures :    NA    Examination  1. Pain in both knees, unspecified chronicity       Involved side: Bilateral  Posture Observation:   Patient reports normal arch height.   Knee alignment is grossly normal.     Knee strength: Pain with 2 legged squat starting at mid range. A/P alignment is WNL with partial squat.  (B) single leg stance is WNL at 30\". No trendelenburg.       Treatment Today  TREATMENT MINUTES COMMENTS   Evaluation 20 Plan of care and goals developed in collaboration with patient.   Discussed findings/condition, related anatomy.  Pt education on PF syndrome.    Self-care/ Home management     Manual therapy     Neuromuscular Re-education     Therapeutic Activity     Therapeutic Exercises 14 Exercises per flow sheet. Therapist demonstrated exercises for patient.    Gait training     Modality__________________                Total 34    Blank areas are intentional and mean " the treatment did not include these items.     PT Evaluation Code: (Please list factors)  Patient History/Comorbidities: none  Examination: 1  Clinical Presentation: stable  Clinical Decision Making: low    Patient History/  Comorbidities Examination  (body structures and functions, activity limitations, and/or participation restrictions) Clinical Presentation Clinical Decision Making (Complexity)   No documented Comorbidities or personal factors 1-2 Elements Stable and/or uncomplicated Low   1-2 documented comorbidities or personal factor 3 Elements Evolving clinical presentation with changing characteristics Moderate   3-4 documented comorbidities or personal factors 4 or more Unstable and unpredictable High              Pamela Becerril  4/29/20

## 2021-07-26 ENCOUNTER — TELEPHONE (OUTPATIENT)
Dept: ALLERGY | Facility: CLINIC | Age: 41
End: 2021-07-26

## 2021-07-26 DIAGNOSIS — J31.0 CHRONIC RHINITIS: Primary | ICD-10-CM

## 2021-07-26 RX ORDER — AZELASTINE 1 MG/ML
2 SPRAY, METERED NASAL 2 TIMES DAILY
Qty: 30 ML | Refills: 3 | Status: SHIPPED | OUTPATIENT
Start: 2021-07-26

## 2021-09-25 ENCOUNTER — HEALTH MAINTENANCE LETTER (OUTPATIENT)
Age: 41
End: 2021-09-25

## 2022-02-17 PROBLEM — F10.20 ALCOHOL DEPENDENCE, UNCOMPLICATED (H): Status: ACTIVE | Noted: 2020-12-18

## 2022-03-31 DIAGNOSIS — J31.0 CHRONIC RHINITIS: ICD-10-CM

## 2022-03-31 RX ORDER — AZELASTINE 1 MG/ML
2 SPRAY, METERED NASAL 2 TIMES DAILY
Qty: 30 ML | Refills: 3 | Status: CANCELLED | OUTPATIENT
Start: 2022-03-31

## 2022-05-07 ENCOUNTER — HEALTH MAINTENANCE LETTER (OUTPATIENT)
Age: 42
End: 2022-05-07

## 2023-04-22 ENCOUNTER — HEALTH MAINTENANCE LETTER (OUTPATIENT)
Age: 43
End: 2023-04-22

## 2023-06-02 ENCOUNTER — HEALTH MAINTENANCE LETTER (OUTPATIENT)
Age: 43
End: 2023-06-02

## 2023-11-16 LAB
ALBUMIN SERPL BCG-MCNC: 4.4 G/DL (ref 3.5–5.2)
ALBUMIN UR-MCNC: NEGATIVE MG/DL
ALP SERPL-CCNC: 74 U/L (ref 40–150)
ALT SERPL W P-5'-P-CCNC: 13 U/L (ref 0–50)
AMPHETAMINES UR QL SCN: ABNORMAL
ANION GAP SERPL CALCULATED.3IONS-SCNC: 14 MMOL/L (ref 7–15)
APPEARANCE UR: CLEAR
AST SERPL W P-5'-P-CCNC: 20 U/L (ref 0–45)
BACTERIA #/AREA URNS HPF: ABNORMAL /HPF
BARBITURATES UR QL SCN: ABNORMAL
BASOPHILS # BLD AUTO: 0 10E3/UL (ref 0–0.2)
BASOPHILS NFR BLD AUTO: 0 %
BENZODIAZ UR QL SCN: ABNORMAL
BILIRUB SERPL-MCNC: 0.2 MG/DL
BILIRUB UR QL STRIP: NEGATIVE
BUN SERPL-MCNC: 9.6 MG/DL (ref 6–20)
BZE UR QL SCN: ABNORMAL
CALCIUM SERPL-MCNC: 8.8 MG/DL (ref 8.6–10)
CANNABINOIDS UR QL SCN: ABNORMAL
CHLORIDE SERPL-SCNC: 99 MMOL/L (ref 98–107)
COLOR UR AUTO: ABNORMAL
CREAT SERPL-MCNC: 0.7 MG/DL (ref 0.51–0.95)
DEPRECATED HCO3 PLAS-SCNC: 24 MMOL/L (ref 22–29)
EGFRCR SERPLBLD CKD-EPI 2021: >90 ML/MIN/1.73M2
EOSINOPHIL # BLD AUTO: 0 10E3/UL (ref 0–0.7)
EOSINOPHIL NFR BLD AUTO: 1 %
ERYTHROCYTE [DISTWIDTH] IN BLOOD BY AUTOMATED COUNT: 12.2 % (ref 10–15)
ETHANOL SERPL-MCNC: 0.2 G/DL
FENTANYL UR QL: ABNORMAL
GLUCOSE SERPL-MCNC: 98 MG/DL (ref 70–99)
GLUCOSE UR STRIP-MCNC: NEGATIVE MG/DL
HCG UR QL: NEGATIVE
HCT VFR BLD AUTO: 37.4 % (ref 35–47)
HGB BLD-MCNC: 13 G/DL (ref 11.7–15.7)
HGB UR QL STRIP: NEGATIVE
IMM GRANULOCYTES # BLD: 0 10E3/UL
IMM GRANULOCYTES NFR BLD: 0 %
KETONES UR STRIP-MCNC: NEGATIVE MG/DL
LEUKOCYTE ESTERASE UR QL STRIP: NEGATIVE
LYMPHOCYTES # BLD AUTO: 2.5 10E3/UL (ref 0.8–5.3)
LYMPHOCYTES NFR BLD AUTO: 31 %
MAGNESIUM SERPL-MCNC: 2.3 MG/DL (ref 1.7–2.3)
MCH RBC QN AUTO: 31.7 PG (ref 26.5–33)
MCHC RBC AUTO-ENTMCNC: 34.8 G/DL (ref 31.5–36.5)
MCV RBC AUTO: 91 FL (ref 78–100)
MONOCYTES # BLD AUTO: 0.5 10E3/UL (ref 0–1.3)
MONOCYTES NFR BLD AUTO: 6 %
NEUTROPHILS # BLD AUTO: 4.9 10E3/UL (ref 1.6–8.3)
NEUTROPHILS NFR BLD AUTO: 62 %
NITRATE UR QL: NEGATIVE
NRBC # BLD AUTO: 0 10E3/UL
NRBC BLD AUTO-RTO: 0 /100
OPIATES UR QL SCN: ABNORMAL
PCP QUAL URINE (ROCHE): ABNORMAL
PH UR STRIP: 5.5 [PH] (ref 5–7)
PLATELET # BLD AUTO: 362 10E3/UL (ref 150–450)
POTASSIUM SERPL-SCNC: 4 MMOL/L (ref 3.4–5.3)
PROT SERPL-MCNC: 7 G/DL (ref 6.4–8.3)
RBC # BLD AUTO: 4.1 10E6/UL (ref 3.8–5.2)
RBC URINE: <1 /HPF
SODIUM SERPL-SCNC: 137 MMOL/L (ref 135–145)
SP GR UR STRIP: 1.01 (ref 1–1.03)
SQUAMOUS EPITHELIAL: <1 /HPF
UROBILINOGEN UR STRIP-MCNC: NORMAL MG/DL
WBC # BLD AUTO: 7.9 10E3/UL (ref 4–11)
WBC URINE: <1 /HPF

## 2023-11-16 PROCEDURE — 83735 ASSAY OF MAGNESIUM: CPT | Performed by: EMERGENCY MEDICINE

## 2023-11-16 PROCEDURE — 85025 COMPLETE CBC W/AUTO DIFF WBC: CPT | Performed by: EMERGENCY MEDICINE

## 2023-11-16 PROCEDURE — 82077 ASSAY SPEC XCP UR&BREATH IA: CPT | Performed by: EMERGENCY MEDICINE

## 2023-11-16 PROCEDURE — 36415 COLL VENOUS BLD VENIPUNCTURE: CPT | Performed by: EMERGENCY MEDICINE

## 2023-11-16 PROCEDURE — 81001 URINALYSIS AUTO W/SCOPE: CPT | Performed by: EMERGENCY MEDICINE

## 2023-11-16 PROCEDURE — 80307 DRUG TEST PRSMV CHEM ANLYZR: CPT | Performed by: EMERGENCY MEDICINE

## 2023-11-16 PROCEDURE — 80053 COMPREHEN METABOLIC PANEL: CPT | Performed by: EMERGENCY MEDICINE

## 2023-11-16 PROCEDURE — 81025 URINE PREGNANCY TEST: CPT | Performed by: EMERGENCY MEDICINE

## 2023-11-16 PROCEDURE — 99283 EMERGENCY DEPT VISIT LOW MDM: CPT

## 2023-11-17 ENCOUNTER — HOSPITAL ENCOUNTER (EMERGENCY)
Facility: CLINIC | Age: 43
Discharge: HOME | End: 2023-11-17
Attending: EMERGENCY MEDICINE | Admitting: EMERGENCY MEDICINE
Payer: COMMERCIAL

## 2023-11-17 VITALS
SYSTOLIC BLOOD PRESSURE: 126 MMHG | BODY MASS INDEX: 22.73 KG/M2 | RESPIRATION RATE: 18 BRPM | OXYGEN SATURATION: 97 % | HEART RATE: 95 BPM | WEIGHT: 150 LBS | TEMPERATURE: 97.8 F | HEIGHT: 68 IN | DIASTOLIC BLOOD PRESSURE: 81 MMHG

## 2023-11-17 DIAGNOSIS — F10.120 ALCOHOL ABUSE WITH UNCOMPLICATED INTOXICATION (H): ICD-10-CM

## 2023-11-17 NOTE — ED NOTES
"Rapid Assessment Note    History:   Tracy Davis is a 43 year old female who presents with concerns of withdrawal. Her family member reports she had history of seizures a couple of years ago. Her friend reported to family that patient was \"out of it\" at her home today and mom thought she was having withdrawals. Patient has been drinking bottle of wine for the past couple of months. Today, she reports drinking 3 cans of beer today. Family member notes she has many medications from her psychiatrist and therapist. She smokes marijuana. Denies shaking, vomiting, diarrhea, headache, hearing things or seeing things.     Exam:   General:  Alert, interactive  Cardiovascular:  Well perfused  Lungs:  No respiratory distress, no accessory muscle use  Neuro:  Moving all 4 extremities  Skin:  Warm, dry  Psych:  Normal affect      Plan of Care:   I evaluated the patient and developed an initial plan of care. I discussed this plan and explained that I, or one of my partners, would be returning to complete the evaluation.       I, Crystal Dickey, am serving as a scribe to document services personally performed by Luis Floyd MD based on my observations and the provider's statements to me.    11/16/2023  [employer]    Portions of this medical record were completed by a scribe. UPON MY REVIEW AND AUTHENTICATION BY ELECTRONIC SIGNATURE, this confirms (a) I performed the applicable clinical services, and (b) the record is accurate.      Luis Floyd MD  11/17/23 0221    "

## 2023-11-17 NOTE — ED PROVIDER NOTES
"    History     Chief Complaint:  Alcohol Intoxication       HPI   Tracy Davis is a 43 year old female who presents with withdrawal. Her family member reports she had history of seizures a couple of years ago. Her friend reported to family that patient was \"out of it\" at her home today and thought she was having a withdrawal. Patient has been drinking bottle of wine for the past couple of months. Today, she reports drinking 3 cans of beer Family member notes she has many medications from her psychiatrist and therapist. She smokes marijuana. Denies shaking, vomiting, diarrhea, headache, hearing things or seeing things.       Independent Historian:    Parent - They supplement the history above    Review of External Notes:  Reviewed primary care office visit from 9/7/2023.  This noted that patient has history of alcohol use disorder with hospitalization for withdrawal.      Past Medical History:    Past Medical History:   Diagnosis Date    Anxiety     Depression     Fracture of right distal radius        Past Surgical History:    Past Surgical History:   Procedure Laterality Date    FOREARM FRACTURE SURGERY Right 7/9/2018    Procedure: RIGHT DISTAL RADIUS FRACTURE OPEN REDUCTION INTERNAL FIXATION;  Surgeon: Rudy Lovell MD;  Location: [facility];  Service:      CORRECT BUNION, W/ OR WO SESAMOIDECTOMY,LAPIDUS TYPE Right 12/4/2020    Procedure: BUNIONECTOMY, LAPIDUS;  Surgeon: Anthony Shepherd DPM;  Location: [facility];  Service: Podiatry    WISDOM TOOTH EXTRACTION            Physical Exam   Patient Vitals for the past 24 hrs:   BP Temp Temp src Pulse Resp SpO2 Height Weight   11/16/23 1955 112/69 97.8  F (36.6  C) Temporal 95 18 97 % 1.727 m (5' 8\") 68 kg (150 lb)        Physical Exam  Vitals and nursing note reviewed.   Constitutional:       General: She is not in acute distress.     Appearance: She is not ill-appearing.   HENT:      Head: Normocephalic and atraumatic.      Right Ear: " External ear normal.      Left Ear: External ear normal.      Nose: Nose normal.      Mouth/Throat:      Mouth: Mucous membranes are moist.   Eyes:      Extraocular Movements: Extraocular movements intact.      Conjunctiva/sclera: Conjunctivae normal.   Cardiovascular:      Rate and Rhythm: Normal rate and regular rhythm.      Heart sounds: No murmur heard.  Pulmonary:      Effort: Pulmonary effort is normal. No respiratory distress.      Breath sounds: Normal breath sounds. No wheezing, rhonchi or rales.   Abdominal:      General: Abdomen is flat. Bowel sounds are normal. There is no distension.      Palpations: Abdomen is soft.      Tenderness: There is no abdominal tenderness. There is no guarding or rebound.   Musculoskeletal:         General: No deformity or signs of injury.      Cervical back: Normal range of motion and neck supple.   Skin:     General: Skin is warm and dry.      Findings: No rash.   Neurological:      Mental Status: She is alert and oriented to person, place, and time.      Motor: No tremor.   Psychiatric:         Mood and Affect: Mood is not anxious.         Behavior: Behavior normal.           Emergency Department Course       Laboratory:  Labs Ordered and Resulted from Time of ED Arrival to Time of ED Departure   ETHYL ALCOHOL LEVEL - Abnormal       Result Value    Alcohol ethyl 0.20 (*)    ROUTINE UA WITH MICROSCOPIC REFLEX TO CULTURE - Abnormal    Color Urine Straw      Appearance Urine Clear      Glucose Urine Negative      Bilirubin Urine Negative      Ketones Urine Negative      Specific Gravity Urine 1.006      Blood Urine Negative      pH Urine 5.5      Protein Albumin Urine Negative      Urobilinogen Urine Normal      Nitrite Urine Negative      Leukocyte Esterase Urine Negative      Bacteria Urine Few (*)     RBC Urine <1      WBC Urine <1      Squamous Epithelials Urine <1     URINE DRUG SCREEN PANEL - Abnormal    Amphetamines Urine Screen Positive (*)     Barbituates Urine  Screen Negative      Benzodiazepine Urine Screen Negative      Cannabinoids Urine Screen Positive (*)     Cocaine Urine Screen Negative      Fentanyl Qual Urine Screen Negative      Opiates Urine Screen Negative      PCP Urine Screen Negative     COMPREHENSIVE METABOLIC PANEL - Normal    Sodium 137      Potassium 4.0      Carbon Dioxide (CO2) 24      Anion Gap 14      Urea Nitrogen 9.6      Creatinine 0.70      GFR Estimate >90      Calcium 8.8      Chloride 99      Glucose 98      Alkaline Phosphatase 74      AST 20      ALT 13      Protein Total 7.0      Albumin 4.4      Bilirubin Total 0.2     HCG QUALITATIVE URINE - Normal    hCG Urine Qualitative Negative     MAGNESIUM - Normal    Magnesium 2.3     CBC WITH PLATELETS AND DIFFERENTIAL    WBC Count 7.9      RBC Count 4.10      Hemoglobin 13.0      Hematocrit 37.4      MCV 91      MCH 31.7      MCHC 34.8      RDW 12.2      Platelet Count 362      % Neutrophils 62      % Lymphocytes 31      % Monocytes 6      % Eosinophils 1      % Basophils 0      % Immature Granulocytes 0      NRBCs per 100 WBC 0      Absolute Neutrophils 4.9      Absolute Lymphocytes 2.5      Absolute Monocytes 0.5      Absolute Eosinophils 0.0      Absolute Basophils 0.0      Absolute Immature Granulocytes 0.0      Absolute NRBCs 0.0            Emergency Department Course & Assessments:             Interventions:  Medications - No data to display     Independent Interpretation (X-rays, CTs, rhythm strip):  None    Consultations/Discussion of Management or Tests:  None       Social Determinants of Health affecting care:  None     Disposition:  The patient was discharged to home.     Impression & Plan    CMS Diagnoses: None    Medical Decision Makin-year-old female presenting with concerns about alcohol withdrawal.  She does not appear to be withdrawing in the ED.  Her vital signs are normal she is not tremulous, diaphoretic, hallucinating, etc.  Her lab work is unremarkable other than her  alcohol level being 0.20.  I did offer to look into detox for her but she would prefer to go home and explore outpatient options.  We did discuss return precautions and advised her that she could always come back to the ER if she develops significant withdrawal symptoms.      Diagnosis:    ICD-10-CM    1. Alcohol abuse with uncomplicated intoxication (H24)  F10.120            Discharge Medications:  New Prescriptions    No medications on file            11/17/2023   Luis Floyd MD Goodwin, Shaun M, MD  11/17/23 0226

## 2023-11-17 NOTE — ED TRIAGE NOTES
Pt comes in today with mother. Mother has concern that Pt will go into withdrawal seizure. Pt has Hx of seizure 3 years ago. A few months ago Pt started drinking 1 bottle of wine a day due to life stressors. Today Pt has no complaints. Denies N/V or HA. Denies hallucinations. Pt is not tremulous.      Triage Assessment (Adult)       Row Name 11/16/23 2003          Triage Assessment    Airway WDL WDL        Respiratory WDL    Respiratory WDL WDL        Cardiac WDL    Cardiac WDL WDL        Cognitive/Neuro/Behavioral WDL    Cognitive/Neuro/Behavioral WDL WDL

## 2024-02-10 ENCOUNTER — HEALTH MAINTENANCE LETTER (OUTPATIENT)
Age: 44
End: 2024-02-10

## 2024-06-29 ENCOUNTER — HEALTH MAINTENANCE LETTER (OUTPATIENT)
Age: 44
End: 2024-06-29

## 2024-09-24 NOTE — TELEPHONE ENCOUNTER
"Routing refill request to provider for review/approval because:  Patient needs to be seen because it has been more than 1 year since last office visit.    Last Written Prescription Date:  7/26/2021  Last Fill Quantity: 3mL,  # refills: 3   Last office visit provider:  11/19/2020     Requested Prescriptions   Pending Prescriptions Disp Refills     azelastine (ASTELIN) 0.1 % nasal spray 30 mL 3     Sig: Spray 2 sprays into both nostrils 2 times daily       Antihistamines Protocol Failed - 3/31/2022  3:32 PM        Failed - Recent (12 mo) or future (30 days) visit within the authorizing provider's specialty     Patient has had an office visit with the authorizing provider or a provider within the authorizing providers department within the previous 12 mos or has a future within next 30 days. See \"Patient Info\" tab in inbasket, or \"Choose Columns\" in Meds & Orders section of the refill encounter.              Passed - Patient is 3-64 years of age     Apply weight-based dosing for peds patients age 3 - 12 years of age.    Forward request to provider for patients under the age of 3 or over the age of 64.          Passed - Medication is active on med list       Nasal Allergy Protocol Failed - 3/31/2022  3:32 PM        Failed - Recent (12 mo) or future (30 days) visit within the authorizing provider's specialty     Patient has had an office visit with the authorizing provider or a provider within the authorizing providers department within the previous 12 mos or has a future within next 30 days. See \"Patient Info\" tab in inbasket, or \"Choose Columns\" in Meds & Orders section of the refill encounter.              Passed - Patient is age 12 or older        Passed - Medication is active on med list             Hanane Lai RN 04/03/22 12:04 AM  "
Refill Request  Medication name: Pending Prescriptions:                       Disp   Refills    azelastine (ASTELIN) 0.1 % nasal spray    30 mL  3            Sig: Spray 2 sprays into both nostrils 2 times daily    Who prescribed the medication: *Summer Burnette MD  Last refill on medication: 7/26/21  Requested Pharmacy: [facility]  Last appointment with PCP: 12/17/2020  Next appointment: Patient due for appointment        
143

## 2025-07-13 ENCOUNTER — HEALTH MAINTENANCE LETTER (OUTPATIENT)
Age: 45
End: 2025-07-13